# Patient Record
Sex: MALE | Race: BLACK OR AFRICAN AMERICAN | NOT HISPANIC OR LATINO | Employment: FULL TIME | URBAN - METROPOLITAN AREA
[De-identification: names, ages, dates, MRNs, and addresses within clinical notes are randomized per-mention and may not be internally consistent; named-entity substitution may affect disease eponyms.]

---

## 2017-02-23 ENCOUNTER — OFFICE VISIT (OUTPATIENT)
Dept: INTERNAL MEDICINE | Facility: CLINIC | Age: 27
End: 2017-02-23
Payer: COMMERCIAL

## 2017-02-23 ENCOUNTER — PATIENT MESSAGE (OUTPATIENT)
Dept: INTERNAL MEDICINE | Facility: CLINIC | Age: 27
End: 2017-02-23

## 2017-02-23 VITALS
WEIGHT: 216 LBS | BODY MASS INDEX: 29.26 KG/M2 | HEIGHT: 72 IN | DIASTOLIC BLOOD PRESSURE: 78 MMHG | HEART RATE: 80 BPM | SYSTOLIC BLOOD PRESSURE: 124 MMHG

## 2017-02-23 DIAGNOSIS — G47.00 INSOMNIA, UNSPECIFIED TYPE: Primary | ICD-10-CM

## 2017-02-23 PROCEDURE — 99213 OFFICE O/P EST LOW 20 MIN: CPT | Mod: S$GLB,,, | Performed by: INTERNAL MEDICINE

## 2017-02-23 PROCEDURE — 1160F RVW MEDS BY RX/DR IN RCRD: CPT | Mod: S$GLB,,, | Performed by: INTERNAL MEDICINE

## 2017-02-23 PROCEDURE — 99999 PR PBB SHADOW E&M-EST. PATIENT-LVL III: CPT | Mod: PBBFAC,,, | Performed by: INTERNAL MEDICINE

## 2017-02-23 RX ORDER — TRAZODONE HYDROCHLORIDE 50 MG/1
50 TABLET ORAL NIGHTLY PRN
Qty: 30 TABLET | Refills: 2 | Status: SHIPPED | OUTPATIENT
Start: 2017-02-23 | End: 2017-04-11 | Stop reason: SDUPTHER

## 2017-02-23 NOTE — MR AVS SNAPSHOT
[facility]  [address]  [facility] [address]  Phone: [phone]  Fax: [phone]                  Sathya Duggan   2017 10:40 AM   Office Visit    Description:  Male : 1990   Provider:  Vern Lew MD   Department:  [facility]           Reason for Visit     Insomnia           Diagnoses this Visit        Comments    Insomnia, unspecified type    -  Primary            To Do List           Goals (5 Years of Data)     None       These Medications        Disp Refills Start End    trazodone (DESYREL) 50 MG tablet 30 tablet 2 2017     Take 1 tablet (50 mg total) by mouth nightly as needed for Insomnia. - Oral    Pharmacy: [facility] [address] [place] [address] AT [facility] #: [phone]         [facility]     [facility] On Call Nurse Care Line -  Assistance  Registered nurses in the [facility] On Call Center provide clinical advisement, health education, appointment booking, and other advisory services.  Call for this free service at [phone].             Medications           Message regarding Medications     Verify the changes and/or additions to your medication regime listed below are the same as discussed with your clinician today.  If any of these changes or additions are incorrect, please notify your healthcare provider.        START taking these NEW medications        Refills    trazodone (DESYREL) 50 MG tablet 2    Sig: Take 1 tablet (50 mg total) by mouth nightly as needed for Insomnia.    Class: Normal    Route: Oral      STOP taking these medications     varenicline (CHANTIX) 1 mg Tab Take 1 tablet (1 mg total) by mouth 2 (two) times daily.    varenicline (CHANTIX JOSE E) 0.5 mg (11)- 1 mg (42) tablet As prescribed in pack    doxycycline (VIBRAMYCIN) 100 MG Cap Take 1 capsule (100 mg total) by mouth once daily.    clindamycin phosphate 1% (CLINDAGEL) 1 % gel Apply topically 2 (two)  times daily. On arms and legs.           Verify that the below list of medications is an accurate representation of the medications you are currently taking.  If none reported, the list may be blank. If incorrect, please contact your healthcare provider. Carry this list with you in case of emergency.           Current Medications     trazodone (DESYREL) 50 MG tablet Take 1 tablet (50 mg total) by mouth nightly as needed for Insomnia.           Clinical Reference Information           Your Vitals Were     BP Pulse Height Weight BMI    124/78 80 6' (1.829 m) 98 kg (216 lb) 29.29 kg/m2      Blood Pressure          Most Recent Value    BP  124/78      Allergies as of 2/23/2017     No Known Allergies      Immunizations Administered on Date of Encounter - 2/23/2017     None      Instructions    Stop pre-work out/caffeine after 2pm.    No naps and do not fall asleep/crash early.    No social media before bed.    Treating Insomnia  Good sleeping habits are a key part of treatment. If needed, some medications may help you sleep better at first. Making healthy lifestyle changes and learning to relax can improve your sleep. Treating insomnia takes commitment, but trust that your efforts will pay off. Talk to your health care provider before taking any medication.    Healthy Lifestyle  Your lifestyle affects your health and your sleep. Here are some healthy habits:  · Keep a regular sleep schedule. Go to bed and get up at the same time each day.  · Exercise regularly. It may help you reduce stress. Avoid strenuous exercise for two to four hours before bedtime.  · Avoid or limit naps, especially in the late afternoon.  · Use your bed only for sleep and sex.  · Dont spend too much time in bed trying to fall asleep. If you cant fall asleep, get up and do something (no electronics) until you become tired and drowsy.  · Avoid or limit caffeine and nicotine for up to 6 hours prior to bedtime. They can keep you awake at  night.   · Also avoid alcohol for at least 4 to 6 hours prior to bedtime. It may help you fall asleep at first, but you will have more awakenings throughout the night, and your sleep will not be restful.  Before Bedtime  To sleep better every night, try these tips:  · Have a bedtime routine to let your body and mind know when its time to sleep.  · Think of going to bed as relaxing and enjoyable. Sleep will come sooner.  · If your worries dont let you sleep, write them down in a diary. Then close it, and go to bed.  · Make sure the room is not too hot or too cold. If its not dark enough, an eye mask can help. If its noisy, try using earplugs.  Learn to Relax  Stress, anxiety, and body tension may keep you awake at night. To unwind before bedtime, try a warm bath, meditation, or yoga. Also, try the following:  · Deep breathing. Sit or lie back in a chair. Take a slow, deep breath. Hold it for 5 counts. Then breathe out slowly through your mouth. Keep doing this until you feel relaxed.  · Progressive muscle relaxation. Tense and then relax the muscles in your body as you breathe deeply. Start with your feet and work up your body to your neck and face.  © 9877-3951 [employer]. [address] [place] [address], [place] [address]. All rights reserved. This information is not intended as a substitute for professional medical care. Always follow your healthcare professional's instructions.         Language Assistance Services     ATTENTION: Language assistance services are available, free of charge. Please call [phone].      ATENCIÓN: Si habla español, tiene a león disposición servicios gratuitos de asistencia lingüística. Llame al [phone].     [facility] Ý: N?u b?n nói Ti?ng Vi?t, có các d?ch v? h? tr? ngôn ng? mi?n phí dành cho b?n. G?i s? [phone].         [facility] complies with applicable Federal civil rights laws and does not discriminate on the basis of race, color, national  origin, age, disability, or sex.

## 2017-02-23 NOTE — PROGRESS NOTES
Subjective:       Patient ID: Sathya Duggan is a 26 y.o. male.    Chief Complaint: Insomnia    HPI Comments: Chronic insomnia. 3 hrs of sleep, then EMA. This is despite being very active.    Has tried benadryl occ helps.    No drugs or etoh.    Some mild anxiety and stress re housing changes.    Review of Systems   Psychiatric/Behavioral: Positive for sleep disturbance. Negative for agitation, behavioral problems, confusion and dysphoric mood.       Objective:      Physical Exam   Constitutional: He appears well-developed and well-nourished. No distress.   Skin: He is not diaphoretic.   Psychiatric: He has a normal mood and affect. His behavior is normal. Judgment and thought content normal.       Assessment:       1. Insomnia, unspecified type        Plan:       Sathya was seen today for insomnia.    Diagnoses and all orders for this visit:    Insomnia, unspecified type  -     trazodone (DESYREL) 50 MG tablet; Take 1 tablet (50 mg total) by mouth nightly as needed for Insomnia.  Pt given handouts.  Stop pre-work out/caffeine after 2pm.    No naps and do not fall asleep/crash early.  Has had occ irregular patterns    No social media before bed.    Explained that if not better in 1-2 mos, pt should rtc/call PCP    Return to clinic previously agreed (at last visit) next due visit

## 2017-02-23 NOTE — PATIENT INSTRUCTIONS
Stop pre-work out/caffeine after 2pm.    No naps and do not fall asleep/crash early.    No social media before bed.    Treating Insomnia  Good sleeping habits are a key part of treatment. If needed, some medications may help you sleep better at first. Making healthy lifestyle changes and learning to relax can improve your sleep. Treating insomnia takes commitment, but trust that your efforts will pay off. Talk to your health care provider before taking any medication.    Healthy Lifestyle  Your lifestyle affects your health and your sleep. Here are some healthy habits:  · Keep a regular sleep schedule. Go to bed and get up at the same time each day.  · Exercise regularly. It may help you reduce stress. Avoid strenuous exercise for two to four hours before bedtime.  · Avoid or limit naps, especially in the late afternoon.  · Use your bed only for sleep and sex.  · Dont spend too much time in bed trying to fall asleep. If you cant fall asleep, get up and do something (no electronics) until you become tired and drowsy.  · Avoid or limit caffeine and nicotine for up to 6 hours prior to bedtime. They can keep you awake at night.   · Also avoid alcohol for at least 4 to 6 hours prior to bedtime. It may help you fall asleep at first, but you will have more awakenings throughout the night, and your sleep will not be restful.  Before Bedtime  To sleep better every night, try these tips:  · Have a bedtime routine to let your body and mind know when its time to sleep.  · Think of going to bed as relaxing and enjoyable. Sleep will come sooner.  · If your worries dont let you sleep, write them down in a diary. Then close it, and go to bed.  · Make sure the room is not too hot or too cold. If its not dark enough, an eye mask can help. If its noisy, try using earplugs.  Learn to Relax  Stress, anxiety, and body tension may keep you awake at night. To unwind before bedtime, try a warm bath, meditation, or yoga. Also, try the  following:  · Deep breathing. Sit or lie back in a chair. Take a slow, deep breath. Hold it for 5 counts. Then breathe out slowly through your mouth. Keep doing this until you feel relaxed.  · Progressive muscle relaxation. Tense and then relax the muscles in your body as you breathe deeply. Start with your feet and work up your body to your neck and face.  © 8698-1164 [employer]. [address] [place] [address], [place] [address]. All rights reserved. This information is not intended as a substitute for professional medical care. Always follow your healthcare professional's instructions.

## 2017-04-10 ENCOUNTER — PATIENT MESSAGE (OUTPATIENT)
Dept: INTERNAL MEDICINE | Facility: CLINIC | Age: 27
End: 2017-04-10

## 2017-04-11 DIAGNOSIS — G47.00 INSOMNIA, UNSPECIFIED TYPE: ICD-10-CM

## 2017-04-11 RX ORDER — TRAZODONE HYDROCHLORIDE 50 MG/1
50 TABLET ORAL NIGHTLY PRN
Qty: 30 TABLET | Refills: 2 | Status: SHIPPED | OUTPATIENT
Start: 2017-04-11 | End: 2017-05-25 | Stop reason: SDUPTHER

## 2017-05-10 ENCOUNTER — PATIENT MESSAGE (OUTPATIENT)
Dept: INTERNAL MEDICINE | Facility: CLINIC | Age: 27
End: 2017-05-10

## 2017-05-11 RX ORDER — VARENICLINE TARTRATE 1 MG/1
1 TABLET, FILM COATED ORAL 2 TIMES DAILY
Qty: 60 TABLET | Refills: 11 | Status: SHIPPED | OUTPATIENT
Start: 2017-05-11 | End: 2018-07-26 | Stop reason: SDUPTHER

## 2017-05-25 ENCOUNTER — PATIENT MESSAGE (OUTPATIENT)
Dept: INTERNAL MEDICINE | Facility: CLINIC | Age: 27
End: 2017-05-25

## 2017-05-25 DIAGNOSIS — G47.00 INSOMNIA, UNSPECIFIED TYPE: ICD-10-CM

## 2017-05-25 RX ORDER — TRAZODONE HYDROCHLORIDE 50 MG/1
50 TABLET ORAL NIGHTLY PRN
Qty: 30 TABLET | Refills: 11 | Status: SHIPPED | OUTPATIENT
Start: 2017-05-25 | End: 2018-07-26 | Stop reason: SDUPTHER

## 2017-11-14 ENCOUNTER — OFFICE VISIT (OUTPATIENT)
Dept: DERMATOLOGY | Facility: CLINIC | Age: 27
End: 2017-11-14
Payer: COMMERCIAL

## 2017-11-14 DIAGNOSIS — L72.0 EPIDERMAL INCLUSION CYST: Primary | ICD-10-CM

## 2017-11-14 DIAGNOSIS — L70.0 ACNE VULGARIS: ICD-10-CM

## 2017-11-14 PROCEDURE — 99999 PR PBB SHADOW E&M-EST. PATIENT-LVL III: CPT | Mod: PBBFAC,,, | Performed by: DERMATOLOGY

## 2017-11-14 PROCEDURE — 99213 OFFICE O/P EST LOW 20 MIN: CPT | Mod: S$GLB,,, | Performed by: DERMATOLOGY

## 2017-11-14 RX ORDER — DOXYCYCLINE 100 MG/1
100 CAPSULE ORAL EVERY 12 HOURS
Qty: 60 CAPSULE | Refills: 0 | Status: SHIPPED | OUTPATIENT
Start: 2017-11-14 | End: 2018-04-03 | Stop reason: ALTCHOICE

## 2017-11-14 NOTE — PROGRESS NOTES
Subjective:       Patient ID:  Sathya Duggan is a 27 y.o. male who presents for   Chief Complaint   Patient presents with    Cyst     back, x months, asym, no tx     Last OV 11/2015    Cyst  - Initial  Affected locations: back  Signs / symptoms: asymptomatic  Timing: constant  Aggravated by: pressure  Relieving factors/Treatments tried: nothing      Past Medical History:   Diagnosis Date    Asthma      Review of Systems   Constitutional: Negative for fever, chills, fatigue and malaise.   Skin: Positive for activity-related sunscreen use. Negative for daily sunscreen use and recent sunburn.   Hematologic/Lymphatic: Does not bruise/bleed easily.        Objective:    Physical Exam   Constitutional: He appears well-developed and well-nourished. No distress.   Neurological: He is alert and oriented to person, place, and time. He is not disoriented.   Psychiatric: He has a normal mood and affect.   Skin:   Areas Examined (abnormalities noted in diagram):   Head / Face Inspection Performed  Neck Inspection Performed  Back Inspection Performed  RUE Inspected  LUE Inspection Performed              Diagram Legend     Erythematous scaling macule/papule c/w actinic keratosis       Vascular papule c/w angioma      Pigmented verrucoid papule/plaque c/w seborrheic keratosis      Yellow umbilicated papule c/w sebaceous hyperplasia      Irregularly shaped tan macule c/w lentigo     1-2 mm smooth white papules consistent with Milia      Movable subcutaneous cyst with punctum c/w epidermal inclusion cyst      Subcutaneous movable cyst c/w pilar cyst      Firm pink to brown papule c/w dermatofibroma      Pedunculated fleshy papule(s) c/w skin tag(s)      Evenly pigmented macule c/w junctional nevus     Mildly variegated pigmented, slightly irregular-bordered macule c/w mildly atypical nevus      Flesh colored to evenly pigmented papule c/w intradermal nevus       Pink pearly papule/plaque c/w basal cell carcinoma      Erythematous  hyperkeratotic cursted plaque c/w SCC      Surgical scar with no sign of skin cancer recurrence      Open and closed comedones      Inflammatory papules and pustules      Verrucoid papule consistent consistent with wart     Erythematous eczematous patches and plaques     Dystrophic onycholytic nail with subungual debris c/w onychomycosis     Umbilicated papule    Erythematous-base heme-crusted tan verrucoid plaque consistent with inflamed seborrheic keratosis     Erythematous Silvery Scaling Plaque c/w Psoriasis     See annotation      Assessment / Plan:        Epidermal inclusion cyst-mildly inflamed  -     doxycycline (MONODOX) 100 MG capsule; Take 1 capsule (100 mg total) by mouth every 12 (twelve) hours.  Dispense: 60 capsule; Refill: 0    Acne vulgaris  Discussed benefits and risks of doxycyline therapy including but not limited to GI discomfort, esophageal irritation/ulceration, and increased sun sensitivity. Patient was counseled to take medicine with meals and at least 1 hour before lying down.   BPO 10% wash to chest and back daily  -     doxycycline (MONODOX) 100 MG capsule; Take 1 capsule (100 mg total) by mouth every 12 (twelve) hours.  Dispense: 60 capsule; Refill: 0             Return for 30 min appt for cyst excision if pt calls.

## 2018-03-09 ENCOUNTER — PATIENT MESSAGE (OUTPATIENT)
Dept: INTERNAL MEDICINE | Facility: CLINIC | Age: 28
End: 2018-03-09

## 2018-03-09 NOTE — TELEPHONE ENCOUNTER
Spoke with pt he said his lip is infected ans swollen for 2 days and now it has a scab on it. Pt would like an appt. Do you think the pt needs an urgent care appt for his lip.  Please advise

## 2018-03-13 ENCOUNTER — OFFICE VISIT (OUTPATIENT)
Dept: INTERNAL MEDICINE | Facility: CLINIC | Age: 28
End: 2018-03-13
Payer: COMMERCIAL

## 2018-03-13 VITALS
HEIGHT: 72 IN | BODY MASS INDEX: 28.46 KG/M2 | OXYGEN SATURATION: 99 % | HEART RATE: 73 BPM | SYSTOLIC BLOOD PRESSURE: 116 MMHG | WEIGHT: 210.13 LBS | DIASTOLIC BLOOD PRESSURE: 70 MMHG

## 2018-03-13 DIAGNOSIS — L72.9 CYST OF SKIN: ICD-10-CM

## 2018-03-13 DIAGNOSIS — K13.0 SORE OF LOWER LIP: Primary | ICD-10-CM

## 2018-03-13 PROCEDURE — 99999 PR PBB SHADOW E&M-EST. PATIENT-LVL III: CPT | Mod: PBBFAC,,, | Performed by: INTERNAL MEDICINE

## 2018-03-13 PROCEDURE — 99213 OFFICE O/P EST LOW 20 MIN: CPT | Mod: S$GLB,,, | Performed by: INTERNAL MEDICINE

## 2018-03-13 PROCEDURE — 87529 HSV DNA AMP PROBE: CPT

## 2018-03-13 NOTE — PROGRESS NOTES
Subjective:       Patient ID: Sathya Duggan is a 27 y.o. male.    Chief Complaint: Oral Swelling    Sore R sided lower lip.  Came on 1 week ago. Started fluid filled, and then popped, it burns a bit.  No other suhc lesions, no hx of HSV, or known exposure.  No f/c/ns.    Also feels lump on his back, has had for sev mos, stable size. occ expresses fluid.      Review of Systems   Constitutional: Negative for activity change, fatigue and fever.   Skin: Negative for rash and wound.       Objective:      Physical Exam   Constitutional: He appears well-developed and well-nourished. No distress.   HENT:   Herpetic appearing sore lower R lip   Skin: He is not diaphoretic.   imflamed Epidermal cyst on the back, 3cm, no redness beyond cyst       Assessment:       1. Sore of lower lip    2. Cyst of skin        Plan:       Sathya was seen today for oral swelling.    Diagnoses and all orders for this visit:    Sore of lower lip  -     Cancel: Herpes simplex virus culture [facility]; Skin (lip); Future  -     Ambulatory Referral to General Surgery  -     Herpes simplex virus culture [facility]; Skin (lip)  -     HSV by Rapid PCR, Non-Blood [facility]; Skin (lip)    Cyst of skin  -     Cancel: Herpes simplex virus culture [facility]; Skin (lip); Future  -     Ambulatory Referral to General Surgery  -     Herpes simplex virus culture [facility]; Skin (lip)    Addendum I called him and explained that we will not have test back for a few days, he voiced understanding and declines to have empiric Valtrex, will await results.

## 2018-03-14 ENCOUNTER — TELEPHONE (OUTPATIENT)
Dept: INTERNAL MEDICINE | Facility: CLINIC | Age: 28
End: 2018-03-14

## 2018-03-14 LAB
HSV1 DNA SPEC QL NAA+PROBE: NEGATIVE
HSV2 DNA SPEC QL NAA+PROBE: NEGATIVE
SPECIMEN SOURCE: NORMAL

## 2018-03-14 NOTE — TELEPHONE ENCOUNTER
Hi, please call the lab and ask when we will have the HSV result back, we need it ASAP to best treat this patient.  Let me know,  Thank you, Vern Lew

## 2018-03-14 NOTE — TELEPHONE ENCOUNTER
Spoke with lab, test was sent to [facility] and will take at least 3 days to come back.

## 2018-03-21 ENCOUNTER — PATIENT MESSAGE (OUTPATIENT)
Dept: INTERNAL MEDICINE | Facility: CLINIC | Age: 28
End: 2018-03-21

## 2018-04-03 ENCOUNTER — OFFICE VISIT (OUTPATIENT)
Dept: INTERNAL MEDICINE | Facility: CLINIC | Age: 28
End: 2018-04-03
Payer: COMMERCIAL

## 2018-04-03 ENCOUNTER — NURSE TRIAGE (OUTPATIENT)
Dept: ADMINISTRATIVE | Facility: CLINIC | Age: 28
End: 2018-04-03

## 2018-04-03 VITALS
WEIGHT: 201 LBS | BODY MASS INDEX: 27.22 KG/M2 | TEMPERATURE: 98 F | OXYGEN SATURATION: 98 % | DIASTOLIC BLOOD PRESSURE: 72 MMHG | HEIGHT: 72 IN | HEART RATE: 73 BPM | SYSTOLIC BLOOD PRESSURE: 126 MMHG

## 2018-04-03 DIAGNOSIS — L70.9 ACNE, UNSPECIFIED ACNE TYPE: Primary | ICD-10-CM

## 2018-04-03 PROCEDURE — 99999 PR PBB SHADOW E&M-EST. PATIENT-LVL IV: CPT | Mod: PBBFAC,,, | Performed by: INTERNAL MEDICINE

## 2018-04-03 PROCEDURE — 99213 OFFICE O/P EST LOW 20 MIN: CPT | Mod: S$GLB,,, | Performed by: INTERNAL MEDICINE

## 2018-04-03 RX ORDER — SULFAMETHOXAZOLE AND TRIMETHOPRIM 800; 160 MG/1; MG/1
1 TABLET ORAL 2 TIMES DAILY
Qty: 14 TABLET | Refills: 0 | Status: SHIPPED | OUTPATIENT
Start: 2018-04-03 | End: 2018-04-10

## 2018-04-03 NOTE — TELEPHONE ENCOUNTER
Reason for Disposition   [1] Boil > 1/4 inch across (> 6 mm; larger than a pencil eraser) AND [2] on face    Protocols used: ST BOIL OR [id number]    Pt states yesterday he noticed red bump and now has swelling between eyes and eyes. Care advice given. appt made.

## 2018-04-04 ENCOUNTER — PATIENT MESSAGE (OUTPATIENT)
Dept: INTERNAL MEDICINE | Facility: CLINIC | Age: 28
End: 2018-04-04

## 2018-04-08 NOTE — PROGRESS NOTES
Subjective:       Patient ID: Sathya Duggan is a 27 y.o. male.    Chief Complaint: Rash    HPI  Complains of redness and irritation of his for head.  No fever, chills, night sweats.  No drainage  Review of Systems   Constitutional: Negative for chills, fatigue, fever and unexpected weight change.   Respiratory: Negative for chest tightness and shortness of breath.    Cardiovascular: Negative for chest pain and palpitations.   Gastrointestinal: Negative for abdominal pain and blood in stool.   Neurological: Negative for dizziness, syncope, numbness and headaches.       Objective:      Physical Exam   HENT:   Right Ear: External ear normal.   Left Ear: External ear normal.   Nose: Nose normal.   Mouth/Throat: Oropharynx is clear and moist.   Eyes: Pupils are equal, round, and reactive to light.   Neck: Normal range of motion.   Cardiovascular: Normal rate and regular rhythm.    No murmur heard.  Pulmonary/Chest: Breath sounds normal.   Abdominal: He exhibits no distension. There is no hepatomegaly. There is no tenderness.   Lymphadenopathy:     He has no cervical adenopathy.     He has no axillary adenopathy.   Neurological: He has normal strength and normal reflexes. No cranial nerve deficit or sensory deficit.     red papules for head  Assessment/Plan           assessment and plan: Folliculitis: Bactrim DS 1 by mouth twice a day ×7 days.  Call if no relief.  He was here for an urgent care only appointment.  He will follow-up with his primary care physician for a physical

## 2018-04-11 ENCOUNTER — INITIAL CONSULT (OUTPATIENT)
Dept: DERMATOLOGY | Facility: CLINIC | Age: 28
End: 2018-04-11
Payer: COMMERCIAL

## 2018-04-11 DIAGNOSIS — L70.0 ACNE VULGARIS: Primary | ICD-10-CM

## 2018-04-11 PROCEDURE — 99999 PR PBB SHADOW E&M-EST. PATIENT-LVL II: CPT | Mod: PBBFAC,,, | Performed by: NURSE PRACTITIONER

## 2018-04-11 PROCEDURE — 99213 OFFICE O/P EST LOW 20 MIN: CPT | Mod: 25,S$GLB,, | Performed by: NURSE PRACTITIONER

## 2018-04-11 PROCEDURE — 11900 INJECT SKIN LESIONS </W 7: CPT | Mod: S$GLB,,, | Performed by: NURSE PRACTITIONER

## 2018-04-11 RX ORDER — ADAPALENE AND BENZOYL PEROXIDE 3; 25 MG/G; MG/G
GEL TOPICAL
Qty: 45 G | Refills: 3 | Status: SHIPPED | OUTPATIENT
Start: 2018-04-11 | End: 2018-05-11 | Stop reason: SDUPTHER

## 2018-04-11 RX ORDER — DOXYCYCLINE 100 MG/1
TABLET ORAL
Qty: 30 TABLET | Refills: 2 | Status: SHIPPED | OUTPATIENT
Start: 2018-04-11 | End: 2022-11-28

## 2018-04-11 NOTE — PROGRESS NOTES
"  Subjective:       Patient ID:  Sathya Duggan is a 27 y.o. male who presents for   Chief Complaint   Patient presents with    Cyst     between eyebrows, 1 week, abx      Acne  - Follow-up  Symptom course: worsening (Last seen by [facility] 11/14/17- treated with course of doxy- helped but symptoms returned. Has been on mulitple courses of doxy in the past. )  Currently using: Washing face BID with neutrogena. Currently on bactrim DS BID for tender "CYST" between eyebrows that developed 4/3/18 (Treated by PCP).   Affected locations: face and back  Signs / symptoms: tender (Large cyst like pimples every now and then with scattered smaller pimples. Noticing increased blackheads over last several years. )  Severity: mild to moderate        Review of Systems   HENT: Negative for nosebleeds and headaches.    Gastrointestinal: Negative for diarrhea and Sensitivity to oral antibiotics.   Musculoskeletal: Negative for arthralgias.   Skin: Positive for activity-related sunscreen use. Negative for daily sunscreen use and recent sunburn.   Neurological: Negative for headaches.   Psychiatric/Behavioral: Negative for depressed mood.        Objective:    Physical Exam   Constitutional: He appears well-developed and well-nourished. No distress.   Neurological: He is alert and oriented to person, place, and time. He is not disoriented.   Psychiatric: He has a normal mood and affect.   Skin:   Areas Examined (abnormalities noted in diagram):   Head / Face Inspection Performed  Neck Inspection Performed  Chest / Axilla Inspection Performed  Abdomen Inspection Performed  Back Inspection Performed                   Diagram Legend     Erythematous scaling macule/papule c/w actinic keratosis       Vascular papule c/w angioma      Pigmented verrucoid papule/plaque c/w seborrheic keratosis      Yellow umbilicated papule c/w sebaceous hyperplasia      Irregularly shaped tan macule c/w lentigo     1-2 mm smooth white papules consistent with Milia "      Movable subcutaneous cyst with punctum c/w epidermal inclusion cyst      Subcutaneous movable cyst c/w pilar cyst      Firm pink to brown papule c/w dermatofibroma      Pedunculated fleshy papule(s) c/w skin tag(s)      Evenly pigmented macule c/w junctional nevus     Mildly variegated pigmented, slightly irregular-bordered macule c/w mildly atypical nevus      Flesh colored to evenly pigmented papule c/w intradermal nevus       Pink pearly papule/plaque c/w basal cell carcinoma      Erythematous hyperkeratotic cursted plaque c/w SCC      Surgical scar with no sign of skin cancer recurrence      Open and closed comedones      Inflammatory papules and pustules      Verrucoid papule consistent consistent with wart     Erythematous eczematous patches and plaques     Dystrophic onycholytic nail with subungual debris c/w onychomycosis     Umbilicated papule    Erythematous-base heme-crusted tan verrucoid plaque consistent with inflamed seborrheic keratosis     Erythematous Silvery Scaling Plaque c/w Psoriasis     See annotation      Assessment / Plan:        Acne vulgaris- cystic  -     doxycycline monohydrate 100 mg Tab; Take 1 po qday  Dispense: 30 tablet; Refill: 2  -     EPIDUO FORTE 0.3-2.5 % GlwP; AAA face qhs  Dispense: 45 g; Refill: 3  -     benzoyl peroxide (BENZEPRO) 6 % Towl; Use daily in shower to trunk/neck  Dispense: 1 each; Refill: 5  Cont washing face BID  Recommend moisturizing daily  Recommend daily sun protection   Recommend wiping face and back after working out and changing clothing directly after sweating.     Intralesional Kenalog 3.0mg/cc (0.2 cc total) injected into 2 lesions on the face and back today after obtaining verbal consent including risk of surrounding hypopigmentation. Patient tolerated procedure well.    Units: 1  NDC for Kenalog 10mg/cc:  5747-4785-51             F/U in 1 month, may consider accutane at f/u if cystic acne continues           No Follow-up on file.

## 2018-04-11 NOTE — LETTER
April 11, 2018      Jessica Luis MD  [address]  [place] [address]           [facility]  [address]  [place] [address]  Phone: [phone]  Fax: [phone]          Patient: Sathya Duggan   MR Number: [id number]   YOB: 1990   Date of Visit: 4/11/2018       Dear Dr. Jessica Luis:    Thank you for referring Sathya Duggan to me for evaluation. Attached you will find relevant portions of my assessment and plan of care.    If you have questions, please do not hesitate to call me. I look forward to following Sathya Duggan along with you.    Sincerely,    Becca Dwyer NP    Enclosure  CC:  No Recipients    If you would like to receive this communication electronically, please contact [email or web address] or [phone] to request more information on [employer] Link access.    For providers and/or their staff who would like to refer a patient to [facility], please contact us through our one-stop-shop provider referral line, [facility], at [phone].    If you feel you have received this communication in error or would no longer like to receive these types of communications, please e-mail [email or web address]

## 2018-04-11 NOTE — PATIENT INSTRUCTIONS

## 2018-04-16 ENCOUNTER — OFFICE VISIT (OUTPATIENT)
Dept: SURGERY | Facility: CLINIC | Age: 28
End: 2018-04-16
Payer: COMMERCIAL

## 2018-04-16 VITALS
SYSTOLIC BLOOD PRESSURE: 135 MMHG | HEART RATE: 77 BPM | HEIGHT: 72 IN | BODY MASS INDEX: 27.33 KG/M2 | DIASTOLIC BLOOD PRESSURE: 78 MMHG | TEMPERATURE: 98 F | WEIGHT: 201.81 LBS

## 2018-04-16 DIAGNOSIS — L72.0 EPIDERMOID CYST OF SKIN: Primary | ICD-10-CM

## 2018-04-16 PROCEDURE — 99999 PR PBB SHADOW E&M-EST. PATIENT-LVL III: CPT | Mod: PBBFAC,,, | Performed by: SURGERY

## 2018-04-16 PROCEDURE — 99243 OFF/OP CNSLTJ NEW/EST LOW 30: CPT | Mod: S$GLB,,, | Performed by: PHYSICIAN ASSISTANT

## 2018-04-16 NOTE — PROGRESS NOTES
Subjective:       Patient ID: Sathya Duggan is a 27 y.o. male.    Chief Complaint: Consult    Sathya Duggan is a 26yo male with back cyst which has been present for about a year. He notes that it got infected, drained and he is currently on doxycycline. He denies fevers, chills. He is here for possible removal of cyst.      Review of Systems   Constitutional: Negative for chills and fever.   HENT: Negative for congestion and sneezing.    Eyes: Negative for photophobia and visual disturbance.   Respiratory: Negative for cough and shortness of breath.    Cardiovascular: Negative for chest pain and palpitations.   Gastrointestinal: Negative for abdominal pain, nausea and vomiting.   Endocrine: Negative for cold intolerance and heat intolerance.   Musculoskeletal: Negative for arthralgias and myalgias.   Skin: Positive for wound. Negative for rash.   Neurological: Negative for weakness.   Psychiatric/Behavioral: Negative for agitation.       Objective:      Physical Exam   Constitutional: He is oriented to person, place, and time. He appears well-developed and well-nourished. No distress.   HENT:   Head: Normocephalic and atraumatic.   Eyes: EOM are normal. No scleral icterus.   Neck: Normal range of motion. Neck supple.   Cardiovascular: Normal rate and regular rhythm.    Pulmonary/Chest: Effort normal. No respiratory distress.   Abdominal: Soft. He exhibits no distension. There is no tenderness.   Musculoskeletal: Normal range of motion. He exhibits no edema or tenderness.   Neurological: He is alert and oriented to person, place, and time.   Skin: Skin is warm and dry.   Mid upper back cyst - minimal erythema, minimal swelling, no drainage appreciated, no TTP   Psychiatric: He has a normal mood and affect.       Assessment:   26 yo male w back cyst  Plan:   Plan for removal in minors in two to three weeks  Continue antibiotics as indicated

## 2018-05-11 ENCOUNTER — OFFICE VISIT (OUTPATIENT)
Dept: DERMATOLOGY | Facility: CLINIC | Age: 28
End: 2018-05-11
Payer: COMMERCIAL

## 2018-05-11 DIAGNOSIS — L70.0 ACNE VULGARIS: Primary | ICD-10-CM

## 2018-05-11 PROCEDURE — 99999 PR PBB SHADOW E&M-EST. PATIENT-LVL III: CPT | Mod: PBBFAC,,, | Performed by: NURSE PRACTITIONER

## 2018-05-11 PROCEDURE — 99213 OFFICE O/P EST LOW 20 MIN: CPT | Mod: S$GLB,,, | Performed by: NURSE PRACTITIONER

## 2018-05-11 RX ORDER — ADAPALENE AND BENZOYL PEROXIDE 3; 25 MG/G; MG/G
GEL TOPICAL
Qty: 45 G | Refills: 3 | Status: SHIPPED | OUTPATIENT
Start: 2018-05-11 | End: 2018-09-13 | Stop reason: SDUPTHER

## 2018-05-11 NOTE — PROGRESS NOTES
Subjective:       Patient ID:  Sathya Duggan is a 27 y.o. male who presents for   Chief Complaint   Patient presents with    Follow-up     Acne     Acne  - Follow-up  Symptom course: improving (Last seen 4/11/18; treated with course of doxy (Completed 1st month) helping. Prescribed Epiduo forte qhs (Has not started medication because forgot to ) and BPP towels to back qday (Never received medication). )  Currently using: Washing face BID with cerave.  Affected locations: face and back  Signs / symptoms: tender (Intermittent tender pimples but not as severe as before starting doxy)  Severity: mild to moderate        Review of Systems   HENT: Negative for nosebleeds and headaches.    Gastrointestinal: Negative for diarrhea and Sensitivity to oral antibiotics.   Musculoskeletal: Negative for arthralgias.   Skin: Positive for activity-related sunscreen use. Negative for daily sunscreen use and recent sunburn.   Neurological: Negative for headaches.   Psychiatric/Behavioral: Negative for depressed mood.        Objective:    Physical Exam   Constitutional: He appears well-developed and well-nourished. No distress.   Neurological: He is alert and oriented to person, place, and time. He is not disoriented.   Psychiatric: He has a normal mood and affect.   Skin:   Areas Examined (abnormalities noted in diagram):   Head / Face Inspection Performed  Neck Inspection Performed  Chest / Axilla Inspection Performed  Abdomen Inspection Performed  Back Inspection Performed                   Diagram Legend     Erythematous scaling macule/papule c/w actinic keratosis       Vascular papule c/w angioma      Pigmented verrucoid papule/plaque c/w seborrheic keratosis      Yellow umbilicated papule c/w sebaceous hyperplasia      Irregularly shaped tan macule c/w lentigo     1-2 mm smooth white papules consistent with Milia      Movable subcutaneous cyst with punctum c/w epidermal inclusion cyst      Subcutaneous movable cyst c/w  pilar cyst      Firm pink to brown papule c/w dermatofibroma      Pedunculated fleshy papule(s) c/w skin tag(s)      Evenly pigmented macule c/w junctional nevus     Mildly variegated pigmented, slightly irregular-bordered macule c/w mildly atypical nevus      Flesh colored to evenly pigmented papule c/w intradermal nevus       Pink pearly papule/plaque c/w basal cell carcinoma      Erythematous hyperkeratotic cursted plaque c/w SCC      Surgical scar with no sign of skin cancer recurrence      Open and closed comedones      Inflammatory papules and pustules      Verrucoid papule consistent consistent with wart     Erythematous eczematous patches and plaques     Dystrophic onycholytic nail with subungual debris c/w onychomycosis     Umbilicated papule    Erythematous-base heme-crusted tan verrucoid plaque consistent with inflamed seborrheic keratosis     Erythematous Silvery Scaling Plaque c/w Psoriasis     See annotation      Assessment / Plan:        Acne vulgaris  -     benzoyl peroxide (BENZEPRO) 6 % Towl; Use daily in shower to trunk/neck  Dispense: 1 each; Refill: 5  -     EPIDUO FORTE 0.3-2.5 % GlwP; AAA face qhs  Dispense: 45 g; Refill: 3  Cont Doxy 100 mg po daily (Has 2 refills)    Recommend starting with pea size amount every other night with moisturizer until can tolerate every night.     Cont washing face BID          Discussed importance of not picking at lesions        Recommend Moisturizing qhs- Cerave PM        Encouraged sunscreen application every morning         Discussed with patient to avoid all face washes containing BP.              Follow-up in about 3 months (around 8/11/2018).

## 2018-05-11 NOTE — PATIENT INSTRUCTIONS

## 2018-07-26 DIAGNOSIS — G47.00 INSOMNIA, UNSPECIFIED TYPE: ICD-10-CM

## 2018-07-27 RX ORDER — TRAZODONE HYDROCHLORIDE 50 MG/1
TABLET ORAL
Qty: 30 TABLET | Refills: 5 | Status: SHIPPED | OUTPATIENT
Start: 2018-07-27 | End: 2022-11-28

## 2018-07-27 RX ORDER — VARENICLINE TARTRATE 1 MG/1
TABLET, FILM COATED ORAL
Qty: 60 TABLET | Refills: 11 | Status: SHIPPED | OUTPATIENT
Start: 2018-07-27 | End: 2019-08-27 | Stop reason: SDUPTHER

## 2018-09-13 ENCOUNTER — OFFICE VISIT (OUTPATIENT)
Dept: DERMATOLOGY | Facility: CLINIC | Age: 28
End: 2018-09-13
Payer: COMMERCIAL

## 2018-09-13 DIAGNOSIS — L70.0 ACNE VULGARIS: Primary | ICD-10-CM

## 2018-09-13 DIAGNOSIS — L21.9 SEBORRHEIC DERMATITIS: ICD-10-CM

## 2018-09-13 PROCEDURE — 99213 OFFICE O/P EST LOW 20 MIN: CPT | Mod: S$GLB,,, | Performed by: NURSE PRACTITIONER

## 2018-09-13 PROCEDURE — 99999 PR PBB SHADOW E&M-EST. PATIENT-LVL II: CPT | Mod: PBBFAC,,, | Performed by: NURSE PRACTITIONER

## 2018-09-13 RX ORDER — DAPSONE 75 MG/G
GEL TOPICAL
Qty: 60 G | Refills: 3 | Status: SHIPPED | OUTPATIENT
Start: 2018-09-13 | End: 2022-11-28

## 2018-09-13 RX ORDER — ADAPALENE AND BENZOYL PEROXIDE 3; 25 MG/G; MG/G
GEL TOPICAL
Qty: 45 G | Refills: 3 | Status: SHIPPED | OUTPATIENT
Start: 2018-09-13 | End: 2022-11-28

## 2018-09-13 RX ORDER — KETOCONAZOLE 20 MG/G
CREAM TOPICAL
Qty: 30 G | Refills: 3 | Status: SHIPPED | OUTPATIENT
Start: 2018-09-13 | End: 2022-11-28

## 2018-09-13 NOTE — PROGRESS NOTES
Subjective:       Patient ID:  Sathya Duggan is a 27 y.o. male who presents for   Chief Complaint   Patient presents with    Acne     f/u     Acne  - Follow-up  Symptom course: unchanged (last seen per NC 5/11/18)  Currently using: epiduo nightly. washing face w/ Neutrogeana nightly & occ BID. not taking PO doxy.   Affected locations: face  Signs / symptoms: asymptomatic      Also c/o new rash to left ear.  Comes and goes x1 year  Denies any treatment  Denies any s/s associated w/ rash    Review of Systems   Constitutional: Negative for fever, chills, fatigue and malaise.   HENT: Negative for nosebleeds and headaches.    Gastrointestinal: Negative for diarrhea and Sensitivity to oral antibiotics.   Musculoskeletal: Negative for arthralgias.   Skin: Negative for daily sunscreen use, activity-related sunscreen use and recent sunburn.   Neurological: Negative for headaches.   Psychiatric/Behavioral: Negative for depressed mood.   Hematologic/Lymphatic: Does not bruise/bleed easily.        Objective:    Physical Exam   Constitutional: He appears well-developed and well-nourished. No distress.   Neurological: He is alert and oriented to person, place, and time. He is not disoriented.   Psychiatric: He has a normal mood and affect.   Skin:   Areas Examined (abnormalities noted in diagram):   Scalp / Hair Palpated and Inspected  Head / Face Inspection Performed  Neck Inspection Performed  Chest / Axilla Inspection Performed  Abdomen Inspection Performed  Back Inspection Performed              Diagram Legend     Erythematous scaling macule/papule c/w actinic keratosis       Vascular papule c/w angioma      Pigmented verrucoid papule/plaque c/w seborrheic keratosis      Yellow umbilicated papule c/w sebaceous hyperplasia      Irregularly shaped tan macule c/w lentigo     1-2 mm smooth white papules consistent with Milia      Movable subcutaneous cyst with punctum c/w epidermal inclusion cyst      Subcutaneous movable cyst  c/w pilar cyst      Firm pink to brown papule c/w dermatofibroma      Pedunculated fleshy papule(s) c/w skin tag(s)      Evenly pigmented macule c/w junctional nevus     Mildly variegated pigmented, slightly irregular-bordered macule c/w mildly atypical nevus      Flesh colored to evenly pigmented papule c/w intradermal nevus       Pink pearly papule/plaque c/w basal cell carcinoma      Erythematous hyperkeratotic cursted plaque c/w SCC      Surgical scar with no sign of skin cancer recurrence      Open and closed comedones      Inflammatory papules and pustules      Verrucoid papule consistent consistent with wart     Erythematous eczematous patches and plaques     Dystrophic onycholytic nail with subungual debris c/w onychomycosis     Umbilicated papule    Erythematous-base heme-crusted tan verrucoid plaque consistent with inflamed seborrheic keratosis     Erythematous Silvery Scaling Plaque c/w Psoriasis     See annotation      Assessment / Plan:        Acne vulgaris  -     benzoyl peroxide (BENZEPRO) 6 % Towl; Use daily in shower to trunk/neck  Dispense: 1 each; Refill: 5  -     EPIDUO FORTE 0.3-2.5 % GlwP; AAA face qhs  Dispense: 45 g; Refill: 3  -     ACZONE 7.5 % GlwP; AAA face daily - bid  Dispense: 60 g; Refill: 3    Wash face BID    Continue Epiduo forte nightly    Add aczone q am     Encourage patient to order BP wipes from [facility]    Seborrheic dermatitis- left ear  -     ketoconazole (NIZORAL) 2 % cream; AAA bid to left ear  Dispense: 30 g; Refill: 3             Follow-up in about 3 months (around 12/13/2018).

## 2018-12-13 ENCOUNTER — LAB VISIT (OUTPATIENT)
Dept: LAB | Facility: HOSPITAL | Age: 28
End: 2018-12-13
Payer: COMMERCIAL

## 2018-12-13 ENCOUNTER — OFFICE VISIT (OUTPATIENT)
Dept: DERMATOLOGY | Facility: CLINIC | Age: 28
End: 2018-12-13
Payer: COMMERCIAL

## 2018-12-13 DIAGNOSIS — L98.9 DERMATOSIS: ICD-10-CM

## 2018-12-13 DIAGNOSIS — L70.0 ACNE VULGARIS: ICD-10-CM

## 2018-12-13 DIAGNOSIS — L98.9 DERMATOSIS: Primary | ICD-10-CM

## 2018-12-13 LAB
ALBUMIN SERPL BCP-MCNC: 4.1 G/DL
ALP SERPL-CCNC: 114 U/L
ALT SERPL W/O P-5'-P-CCNC: 19 U/L
ANION GAP SERPL CALC-SCNC: 7 MMOL/L
AST SERPL-CCNC: 19 U/L
BASOPHILS # BLD AUTO: 0.04 K/UL
BASOPHILS NFR BLD: 0.7 %
BILIRUB SERPL-MCNC: 0.7 MG/DL
BUN SERPL-MCNC: 12 MG/DL
CALCIUM SERPL-MCNC: 9.2 MG/DL
CHLORIDE SERPL-SCNC: 110 MMOL/L
CO2 SERPL-SCNC: 24 MMOL/L
CREAT SERPL-MCNC: 1.3 MG/DL
DIFFERENTIAL METHOD: ABNORMAL
EOSINOPHIL # BLD AUTO: 0.2 K/UL
EOSINOPHIL NFR BLD: 2.6 %
ERYTHROCYTE [DISTWIDTH] IN BLOOD BY AUTOMATED COUNT: 12.5 %
EST. GFR  (AFRICAN AMERICAN): >60 ML/MIN/1.73 M^2
EST. GFR  (NON AFRICAN AMERICAN): >60 ML/MIN/1.73 M^2
GLUCOSE SERPL-MCNC: 89 MG/DL
HCT VFR BLD AUTO: 45.1 %
HGB BLD-MCNC: 16.1 G/DL
IMM GRANULOCYTES # BLD AUTO: 0.01 K/UL
IMM GRANULOCYTES NFR BLD AUTO: 0.2 %
LYMPHOCYTES # BLD AUTO: 2 K/UL
LYMPHOCYTES NFR BLD: 34.7 %
MCH RBC QN AUTO: 33.4 PG
MCHC RBC AUTO-ENTMCNC: 35.7 G/DL
MCV RBC AUTO: 94 FL
MONOCYTES # BLD AUTO: 0.5 K/UL
MONOCYTES NFR BLD: 8.4 %
NEUTROPHILS # BLD AUTO: 3 K/UL
NEUTROPHILS NFR BLD: 53.4 %
NRBC BLD-RTO: 0 /100 WBC
PLATELET # BLD AUTO: 230 K/UL
PMV BLD AUTO: 10.3 FL
POTASSIUM SERPL-SCNC: 4.2 MMOL/L
PROT SERPL-MCNC: 8.5 G/DL
RBC # BLD AUTO: 4.82 M/UL
SODIUM SERPL-SCNC: 141 MMOL/L
WBC # BLD AUTO: 5.7 K/UL

## 2018-12-13 PROCEDURE — 88305 TISSUE EXAM BY PATHOLOGIST: CPT | Performed by: PATHOLOGY

## 2018-12-13 PROCEDURE — 86235 NUCLEAR ANTIGEN ANTIBODY: CPT

## 2018-12-13 PROCEDURE — 86039 ANTINUCLEAR ANTIBODIES (ANA): CPT

## 2018-12-13 PROCEDURE — 36415 COLL VENOUS BLD VENIPUNCTURE: CPT

## 2018-12-13 PROCEDURE — 80053 COMPREHEN METABOLIC PANEL: CPT

## 2018-12-13 PROCEDURE — 11100 PR BIOPSY OF SKIN LESION: CPT | Mod: S$GLB,,, | Performed by: NURSE PRACTITIONER

## 2018-12-13 PROCEDURE — 11101 PR BIOPSY, EACH ADDED LESION: CPT | Mod: S$GLB,,, | Performed by: NURSE PRACTITIONER

## 2018-12-13 PROCEDURE — 99999 PR PBB SHADOW E&M-EST. PATIENT-LVL III: CPT | Mod: PBBFAC,,, | Performed by: NURSE PRACTITIONER

## 2018-12-13 PROCEDURE — 85025 COMPLETE CBC W/AUTO DIFF WBC: CPT

## 2018-12-13 PROCEDURE — 86038 ANTINUCLEAR ANTIBODIES: CPT

## 2018-12-13 PROCEDURE — 99214 OFFICE O/P EST MOD 30 MIN: CPT | Mod: 25,S$GLB,, | Performed by: NURSE PRACTITIONER

## 2018-12-13 RX ORDER — BETAMETHASONE DIPROPIONATE 0.5 MG/G
CREAM TOPICAL
Qty: 45 G | Refills: 0 | Status: SHIPPED | OUTPATIENT
Start: 2018-12-13 | End: 2022-11-28

## 2018-12-13 NOTE — PROGRESS NOTES
Subjective:       Patient ID:  Sathya Duggan is a 28 y.o. male who presents for   Chief Complaint   Patient presents with    Acne     follow up     Acne  - Follow-up  Symptom course: unchanged (last seen 9/13/18)  Currently using: Epiduo forte qhs, keto crm to left ear and cheek, and Neutrogena face wash.  Signs / symptoms: dryness    Rash  - Follow-up  Symptom course: worsening (last seen 9/13/18)  Currently using: Initally presented on left helix, started on Ketoconazole cream. Patient reports has spread to left cheek   Affected locations: face  Signs / symptoms: scaling and spreading  Severity: mild      Denies any family h/o autoimmune diseases    Review of Systems   Constitutional: Negative for fever, chills, weight loss, weight gain, fatigue, night sweats and malaise.        Smokes 1ppd   Musculoskeletal: Negative for myalgias, joint swelling and arthralgias.   Skin: Positive for rash and dry skin.   Hematologic/Lymphatic: Does not bruise/bleed easily.        Objective:    Physical Exam   Constitutional: He appears well-developed and well-nourished.   Neurological: He is alert and oriented to person, place, and time.   Psychiatric: He has a normal mood and affect.   Skin:   Areas Examined (abnormalities noted in diagram):   Scalp / Hair Palpated and Inspected  Head / Face Inspection Performed  Neck Inspection Performed  Chest / Axilla Inspection Performed              Diagram Legend     Erythematous scaling macule/papule c/w actinic keratosis       Vascular papule c/w angioma      Pigmented verrucoid papule/plaque c/w seborrheic keratosis      Yellow umbilicated papule c/w sebaceous hyperplasia      Irregularly shaped tan macule c/w lentigo     1-2 mm smooth white papules consistent with Milia      Movable subcutaneous cyst with punctum c/w epidermal inclusion cyst      Subcutaneous movable cyst c/w pilar cyst      Firm pink to brown papule c/w dermatofibroma      Pedunculated fleshy papule(s) c/w skin  tag(s)      Evenly pigmented macule c/w junctional nevus     Mildly variegated pigmented, slightly irregular-bordered macule c/w mildly atypical nevus      Flesh colored to evenly pigmented papule c/w intradermal nevus       Pink pearly papule/plaque c/w basal cell carcinoma      Erythematous hyperkeratotic cursted plaque c/w SCC      Surgical scar with no sign of skin cancer recurrence      Open and closed comedones      Inflammatory papules and pustules      Verrucoid papule consistent consistent with wart     Erythematous eczematous patches and plaques     Dystrophic onycholytic nail with subungual debris c/w onychomycosis     Umbilicated papule    Erythematous-base heme-crusted tan verrucoid plaque consistent with inflamed seborrheic keratosis     Erythematous Silvery Scaling Plaque c/w Psoriasis     See annotation              Assessment / Plan:      Pathology Orders:     Normal Orders This Visit    Tissue Specimen To Pathology, Dermatology     Questions:    Directional Terms:  Other(comment)    Clinical information:  r/o DLE vs other    Specific Site:  right prearicular (florencia-lesional)    Tissue Specimen To Pathology, Dermatology     Questions:    Directional Terms:  Other(comment)    Clinical information:  r/o DLE    Specific Site:  left lateral cheek- lesional        Dermatosis, NOS  Likely DLE  -     Tissue Specimen To Pathology, Dermatology  -     Tissue Specimen To Pathology, Dermatology  -     CONNIE; Future  -     CBC auto differential; Future  -     Comprehensive metabolic panel; Future  -     augmented betamethasone dipropionate (DIPROLENE AF) 0.05 % cream; AAA scabbed lesions to face BID  Dispense: 45 g; Refill: 0    Punch biopsy procedure note:x2  Punch biopsy performed after verbal consent obtained. Area marked and prepped with alcohol. Approximately 1cc of 1% lidocaine with epinephrine injected. 3 mm disposable punch used to remove lesion. Hemostasis obtained and biopsy site closed with 1 - 2 Prolene  sutures. Wound care instructions reviewed with patient and handout given.    Patient instructed in importance in daily sun protection of at least spf 30. Sun avoidance and topical protection discussed.   Recommend elta MD clear for daily use on face and neck.  Patient encouraged to wear hat for all outdoor exposure.       Patient also seen and evaluated today by collaborating physician, Dr. Tripathi.     Acne vulgaris- KAMLA  Wash face BID w/ gentle cleanser  Ok to continue Epiduo forte to chin & forehead               Follow-up in about 2 weeks (around 12/27/2018) for Biopsy results with JAM.

## 2018-12-13 NOTE — PATIENT INSTRUCTIONS
Continue Epiduo forte to chin & forehead  Diprolene to scabs twice/day  Sunscreen in AM  Hat when outside     Punch Biopsy Wound Care    Your doctor has performed a punch biopsy today.  A band aid and antibiotic ointment has been placed over the site.  This should remain in place for 24 hours.  It is recommended that you keep the area dry for the first 24 hours.  After 24 hours, you may remove the band aid and wash the area with warm soap and water and apply Vaseline jelly.  Many patients prefer to use Neosporin or Bacitracin ointment.  This is acceptable; however know that you can develop an allergy to this medication even if you have used it safely for years.  It is important to keep the area moist.  Letting it dry out and get air slows healing time, will worsen the scar, and make it more difficult to remove the stitches if they were placed.  Band aid is optional after first 24 hours.      If you notice increasing redness, tenderness, pain, or yellow drainage at the biopsy or surgical site, please notify your doctor.  These are signs of an infection.    If your biopsy/surgical site is bleeding, apply firm pressure for 15 minutes straight.  Repeat for another 15 minutes, if it is still bleeding.   If the surgical site continues to bleed, then please contact your doctor.      [facility] [place] [address]/ [phone] FAX/ [email or web address]         Continue Epiduo forte to chin & forehead  Diprolene to scabs twice/day  Sunscreen in AM  Hat when outside

## 2018-12-14 LAB
ANA SER QL IF: POSITIVE
ANA TITR SER IF: NORMAL {TITER}

## 2018-12-18 LAB
ANTI SM ANTIBODY: 2.16 EU
ANTI SM/RNP ANTIBODY: 13.72 EU
ANTI-SM INTERPRETATION: NEGATIVE
ANTI-SM/RNP INTERPRETATION: NEGATIVE
ANTI-SSA ANTIBODY: 3.46 EU
ANTI-SSA INTERPRETATION: NEGATIVE
ANTI-SSB ANTIBODY: 0.62 EU
ANTI-SSB INTERPRETATION: NEGATIVE
DSDNA AB SER-ACNC: NORMAL [IU]/ML

## 2018-12-27 ENCOUNTER — OFFICE VISIT (OUTPATIENT)
Dept: DERMATOLOGY | Facility: CLINIC | Age: 28
End: 2018-12-27
Payer: COMMERCIAL

## 2018-12-27 DIAGNOSIS — L93.0 DISCOID LUPUS ERYTHEMATOSUS: Primary | ICD-10-CM

## 2018-12-27 DIAGNOSIS — L93.0 DISCOID LUPUS: Primary | ICD-10-CM

## 2018-12-27 PROCEDURE — 99213 OFFICE O/P EST LOW 20 MIN: CPT | Mod: S$GLB,,, | Performed by: DERMATOLOGY

## 2018-12-27 PROCEDURE — 99999 PR PBB SHADOW E&M-EST. PATIENT-LVL II: CPT | Mod: PBBFAC,,, | Performed by: DERMATOLOGY

## 2018-12-27 RX ORDER — HYDROXYCHLOROQUINE SULFATE 200 MG/1
200 TABLET, FILM COATED ORAL 2 TIMES DAILY
Qty: 60 TABLET | Refills: 3 | Status: SHIPPED | OUTPATIENT
Start: 2018-12-27 | End: 2022-11-28

## 2018-12-27 NOTE — PROGRESS NOTES
Subjective:       Patient ID:  Sathya Duggan is a 28 y.o. male who presents for   Chief Complaint   Patient presents with    Rash     Bx results      Last seen by 12/13/18 by GP for bx:    FINAL PATHOLOGIC DIAGNOSIS  1. Skin, right preauricular (perilesional), punch biopsy:  - CHANGES MOST COMPATIBLE WITH DISCOID LUPUS ERYTHEMATOSUS.  MICROSCOPIC DESCRIPTION: The biopsy shows compact orthokeratosis, epidermal atrophy and focal vacuolar  interface changes with rare apoptotic keratinocytes. Follicular plugging is also noted. There is a superficial and deep  dermal perivascular and perifollicular lymphohistiocytic infiltrate. Increased dermal mucin is also noted.  2. Skin, left lateral cheek (lesional), punch biopsy:  - CHANGES MOST COMPATIBLE WITH DISCOID LUPUS ERYTHEMATOSUS.    CONNIE positive at 1:640 homogenous        Review of Systems   HENT: Negative for headaches.    Respiratory: Negative for shortness of breath.         Pt smokes 1 ppd - on chantix     Musculoskeletal: Negative for arthralgias.   Skin: Positive for rash, sun sensitivity and activity-related sunscreen use. Negative for itching, daily sunscreen use and recent sunburn.   Neurological: Negative for seizures and headaches.        Objective:    Physical Exam   Constitutional: He appears well-developed and well-nourished. No distress.   Neurological: He is alert and oriented to person, place, and time. He is not disoriented.   Psychiatric: He has a normal mood and affect.   Skin:   Areas Examined (abnormalities noted in diagram):   Scalp / Hair Palpated and Inspected  Head / Face Inspection Performed  Neck Inspection Performed  Chest / Axilla Inspection Performed              Diagram Legend     Erythematous scaling macule/papule c/w actinic keratosis       Vascular papule c/w angioma      Pigmented verrucoid papule/plaque c/w seborrheic keratosis      Yellow umbilicated papule c/w sebaceous hyperplasia      Irregularly shaped tan macule c/w lentigo      1-2 mm smooth white papules consistent with Milia      Movable subcutaneous cyst with punctum c/w epidermal inclusion cyst      Subcutaneous movable cyst c/w pilar cyst      Firm pink to brown papule c/w dermatofibroma      Pedunculated fleshy papule(s) c/w skin tag(s)      Evenly pigmented macule c/w junctional nevus     Mildly variegated pigmented, slightly irregular-bordered macule c/w mildly atypical nevus      Flesh colored to evenly pigmented papule c/w intradermal nevus       Pink pearly papule/plaque c/w basal cell carcinoma      Erythematous hyperkeratotic cursted plaque c/w SCC      Surgical scar with no sign of skin cancer recurrence      Open and closed comedones      Inflammatory papules and pustules      Verrucoid papule consistent consistent with wart     Erythematous eczematous patches and plaques     Dystrophic onycholytic nail with subungual debris c/w onychomycosis     Umbilicated papule    Erythematous-base heme-crusted tan verrucoid plaque consistent with inflamed seborrheic keratosis     Erythematous Silvery Scaling Plaque c/w Psoriasis     See annotation    Lab Results   Component Value Date    WBC 5.70 12/13/2018    HGB 16.1 12/13/2018    HCT 45.1 12/13/2018    MCV 94 12/13/2018     12/13/2018     Lab Results   Component Value Date    ALT 19 12/13/2018    AST 19 12/13/2018    ALKPHOS 114 12/13/2018    BILITOT 0.7 12/13/2018     CONNIE positive at 1:640 homogenous    Assessment / Plan:        Discoid lupus erythematosus  -     hydroxychloroquine (PLAQUENIL) 200 mg tablet; Take 1 tablet (200 mg total) by mouth 2 (two) times daily.  Dispense: 60 tablet; Refill: 3  D/c smoking  Aggressive sun protection  -     Ambulatory Referral to Rheumatology  Does not meet criteria for SLE at this time. Will send to rheum for baseline eval.       Discoid lupus erythematosus  Today's Plan:      Discussed benefits and risks for treatment with Plaquenil with patient. Patient will require a baseline and  yearly ophthalmologic examination.   Will need CBC q month x 3 then q 3 - 6 months. Patient expresses understanding.  Needs aggressive sun protection.    Must D/C smoking when taking Plaquenil as smoking decreases effectiveness.          Follow-up in about 3 months (around 3/27/2019) for with labs.

## 2018-12-27 NOTE — ASSESSMENT & PLAN NOTE
Today's Plan:      Discussed benefits and risks for treatment with Plaquenil with patient. Patient will require a baseline and yearly ophthalmologic examination.   Will need CBC q month x 3 then q 3 - 6 months. Patient expresses understanding.  Needs aggressive sun protection.    Must D/C smoking when taking Plaquenil as smoking decreases effectiveness.

## 2019-02-18 ENCOUNTER — LAB VISIT (OUTPATIENT)
Dept: LAB | Facility: HOSPITAL | Age: 29
End: 2019-02-18
Attending: INTERNAL MEDICINE
Payer: COMMERCIAL

## 2019-02-18 ENCOUNTER — INITIAL CONSULT (OUTPATIENT)
Dept: RHEUMATOLOGY | Facility: CLINIC | Age: 29
End: 2019-02-18
Payer: COMMERCIAL

## 2019-02-18 VITALS
WEIGHT: 225.75 LBS | HEIGHT: 72 IN | DIASTOLIC BLOOD PRESSURE: 93 MMHG | BODY MASS INDEX: 30.58 KG/M2 | HEART RATE: 87 BPM | SYSTOLIC BLOOD PRESSURE: 135 MMHG

## 2019-02-18 DIAGNOSIS — L93.0 DISCOID LUPUS ERYTHEMATOSUS: Primary | ICD-10-CM

## 2019-02-18 DIAGNOSIS — L93.0 DISCOID LUPUS ERYTHEMATOSUS: ICD-10-CM

## 2019-02-18 LAB
C3 SERPL-MCNC: 93 MG/DL
C4 SERPL-MCNC: 18 MG/DL
CCP AB SER IA-ACNC: <0.5 U/ML
DAT IGG-SP REAG RBC-IMP: NORMAL
RHEUMATOID FACT SERPL-ACNC: 15 IU/ML

## 2019-02-18 PROCEDURE — 87340 HEPATITIS B SURFACE AG IA: CPT

## 2019-02-18 PROCEDURE — 86146 BETA-2 GLYCOPROTEIN ANTIBODY: CPT

## 2019-02-18 PROCEDURE — 99204 OFFICE O/P NEW MOD 45 MIN: CPT | Mod: S$GLB,,, | Performed by: INTERNAL MEDICINE

## 2019-02-18 PROCEDURE — 99204 PR OFFICE/OUTPT VISIT, NEW, LEVL IV, 45-59 MIN: ICD-10-PCS | Mod: S$GLB,,, | Performed by: INTERNAL MEDICINE

## 2019-02-18 PROCEDURE — 86803 HEPATITIS C AB TEST: CPT

## 2019-02-18 PROCEDURE — 86787 VARICELLA-ZOSTER ANTIBODY: CPT

## 2019-02-18 PROCEDURE — 86147 CARDIOLIPIN ANTIBODY EA IG: CPT | Mod: 59

## 2019-02-18 PROCEDURE — 86704 HEP B CORE ANTIBODY TOTAL: CPT

## 2019-02-18 PROCEDURE — 3008F PR BODY MASS INDEX (BMI) DOCUMENTED: ICD-10-PCS | Mod: CPTII,S$GLB,, | Performed by: INTERNAL MEDICINE

## 2019-02-18 PROCEDURE — 3008F BODY MASS INDEX DOCD: CPT | Mod: CPTII,S$GLB,, | Performed by: INTERNAL MEDICINE

## 2019-02-18 PROCEDURE — 86682 HELMINTH ANTIBODY: CPT

## 2019-02-18 PROCEDURE — 99999 PR PBB SHADOW E&M-EST. PATIENT-LVL III: ICD-10-PCS | Mod: PBBFAC,,, | Performed by: INTERNAL MEDICINE

## 2019-02-18 PROCEDURE — 99999 PR PBB SHADOW E&M-EST. PATIENT-LVL III: CPT | Mod: PBBFAC,,, | Performed by: INTERNAL MEDICINE

## 2019-02-18 PROCEDURE — 86235 NUCLEAR ANTIGEN ANTIBODY: CPT

## 2019-02-18 PROCEDURE — 86431 RHEUMATOID FACTOR QUANT: CPT

## 2019-02-18 PROCEDURE — 86790 VIRUS ANTIBODY NOS: CPT

## 2019-02-18 PROCEDURE — 86200 CCP ANTIBODY: CPT

## 2019-02-18 PROCEDURE — 86160 COMPLEMENT ANTIGEN: CPT

## 2019-02-18 PROCEDURE — 86880 COOMBS TEST DIRECT: CPT

## 2019-02-18 PROCEDURE — 86592 SYPHILIS TEST NON-TREP QUAL: CPT

## 2019-02-18 PROCEDURE — 86160 COMPLEMENT ANTIGEN: CPT | Mod: 59

## 2019-02-18 PROCEDURE — 85613 RUSSELL VIPER VENOM DILUTED: CPT

## 2019-02-18 PROCEDURE — 86706 HEP B SURFACE ANTIBODY: CPT

## 2019-02-18 PROCEDURE — 86703 HIV-1/HIV-2 1 RESULT ANTBDY: CPT

## 2019-02-18 ASSESSMENT — ROUTINE ASSESSMENT OF PATIENT INDEX DATA (RAPID3)
PAIN SCORE: 0
PATIENT GLOBAL ASSESSMENT SCORE: 0
MDHAQ FUNCTION SCORE: 0
TOTAL RAPID3 SCORE: 0
PSYCHOLOGICAL DISTRESS SCORE: 2.2
FATIGUE SCORE: 0
AM STIFFNESS SCORE: 0, NO

## 2019-02-18 NOTE — PROGRESS NOTES
Chief Complaint   Patient presents with    Disease Management    Pain       Patient was referred by      History of presenting illness    28 year old black male comes in with    A rash on the face   Looks like acne and scars  Started early 2018  Initially treated as acne and then recently had a skin biopsy  Sun triggered it     FINAL PATHOLOGIC DIAGNOSIS  1. Skin, right preauricular (perilesional), punch biopsy:  - CHANGES MOST COMPATIBLE WITH DISCOID LUPUS ERYTHEMATOSUS.  MICROSCOPIC DESCRIPTION: The biopsy shows compact orthokeratosis, epidermal atrophy and focal vacuolar  interface changes with rare apoptotic keratinocytes. Follicular plugging is also noted. There is a superficial and deep  dermal perivascular and perifollicular lymphohistiocytic infiltrate. Increased dermal mucin is also noted.  2. Skin, left lateral cheek (lesional), punch biopsy:  - CHANGES MOST COMPATIBLE WITH DISCOID LUPUS ERYTHEMATOSUS.    He has tried topical steroids and oral steroids   More recently he was started on plaquenil and he didn't take it      He also has PIP pain intermittently  He does everything with no abnormal joint pains  He did have some swelling     No telangiectasias  No calcinosis  No malar rash     No patchy alopecia  He has family h/o losing hair and he keeps it shaved   No oral and nasal ulcers  No sicca symptoms     No pleurisy or any cardiopulmonary complaints  No dysphagia,diplopia and dysphonia and muscle weakness    No n/v/d/c  No acid reflux+    No raynaud's+  No digital ulcers     No cytopenias  No renal issues    No blood clots     No fever,chills,night sweats,weight loss and loss of appetite     CONNIE positive  Subsets negative  CONNIE positive at 1:640 homogenous     CBC nml  CMP nml      Past history : discoid lupus    Family history : aunt has autoimmune illness    Social history : former smoker,quit   Quit jan 2019    Review of Systems   Constitutional: Negative for activity change, appetite  change, chills, diaphoresis, fatigue, fever and unexpected weight change.   HENT: Negative for congestion, dental problem, drooling, ear discharge, ear pain, facial swelling, hearing loss, mouth sores, nosebleeds, postnasal drip, rhinorrhea, sinus pressure, sinus pain, sneezing, sore throat, tinnitus, trouble swallowing and voice change.    Eyes: Negative for photophobia, pain, discharge, redness, itching and visual disturbance.   Respiratory: Negative for apnea, cough, choking, chest tightness, shortness of breath, wheezing and stridor.    Cardiovascular: Negative for chest pain, palpitations and leg swelling.   Gastrointestinal: Negative for abdominal distention, abdominal pain, anal bleeding, blood in stool, constipation, diarrhea, nausea, rectal pain and vomiting.   Endocrine: Negative for cold intolerance, heat intolerance, polydipsia, polyphagia and polyuria.   Genitourinary: Negative for decreased urine volume, difficulty urinating, dysuria, enuresis, flank pain, frequency, genital sores, hematuria and urgency.   Musculoskeletal: Positive for arthralgias. Negative for back pain, gait problem, joint swelling, myalgias, neck pain and neck stiffness.   Skin: Positive for rash. Negative for color change, pallor and wound.   Allergic/Immunologic: Negative for environmental allergies, food allergies and immunocompromised state.   Neurological: Negative for dizziness, tremors, seizures, syncope, facial asymmetry, speech difficulty, weakness, light-headedness, numbness and headaches.   Hematological: Negative for adenopathy. Does not bruise/bleed easily.   Psychiatric/Behavioral: Negative for agitation, behavioral problems, confusion, decreased concentration, dysphoric mood, hallucinations, self-injury, sleep disturbance and suicidal ideas. The patient is not nervous/anxious and is not hyperactive.      Physical Exam     MERAZ-28 tender joint count: 0  MERAZ-28 swollen joint count: 0    Physical Exam   Constitutional: He  is oriented to person, place, and time and well-developed, well-nourished, and in no distress. No distress.   HENT:   Head: Normocephalic.   Mouth/Throat: Oropharynx is clear and moist.   Eyes: Conjunctivae are normal. Pupils are equal, round, and reactive to light. Right eye exhibits no discharge. Left eye exhibits no discharge. No scleral icterus.   Neck: Normal range of motion. No thyromegaly present.   Cardiovascular: Normal rate, regular rhythm, normal heart sounds and intact distal pulses.    Pulmonary/Chest: Effort normal and breath sounds normal. No stridor.   Abdominal: Soft. Bowel sounds are normal.   Lymphadenopathy:     He has no cervical adenopathy.   Neurological: He is alert and oriented to person, place, and time.   Skin: Skin is warm. No rash noted. He is not diaphoretic.     Psychiatric: Affect and judgment normal.   Musculoskeletal: Normal range of motion.       Hyperpigmented lesion on the left side of the face with scarring  Erythematous active lesion on the right side of the face     Assessment     28 year old black male with well controlled asthma  One year of rash  Derm eval recently : discoid lupus  Has had some hand arthralgias with pain and swelling in the PIPs    CONNIE positive subsets negative    He has discoid lupus + joint pains    He doesn't fit the criteria for systemic lupus    1. Discoid lupus erythematosus          New problem     Plan    Would start taking the plaquenil 200 mg bid as suggested by dermatology and see an eye doctor every year    RA panel    Will complete labs for the systemic lupus eval    See us every 6 months for clinical and lab monitoring    Sunscreen use  Avoid getting back smoking   Eat healthy    Aleph was seen today for disease management and pain.    Diagnoses and all orders for this visit:    Discoid lupus erythematosus  -     Sjogrens syndrome-A extractable nuclear antibody; Future  -     Rheumatoid factor; Future  -     Cyclic citrul peptide antibody,  IgG; Future  -     HIV 1/2 Ag/Ab (4th Gen); Future  -     Hepatitis B surface antigen; Future  -     HBcAB; Future  -     Hepatitis B surface antibody; Future  -     Hepatitis C antibody; Future  -     Hepatitis A antibody, IgG; Future  -     Strongyloides IgG Antibodies; Future  -     Quantiferon Gold TB; Future  -     RPR; Future  -     Varicella zoster antibody, IgG; Future  -     Cardiolipin antibody; Future  -     DRVVT; Future  -     Beta-2 glycoprotein antibodies; Future  -     Direct antiglobulin test; Future  -     C3 complement; Future  -     C4 complement; Future  -     Protein / creatinine ratio, urine; Future  -     Urinalysis; Future

## 2019-02-18 NOTE — LETTER
February 18, 2019      Faby Tripathi MD  [address]  [facility] [address]           [facility]  [address]  [facility] [address]  Phone: [phone]  Fax: [phone]          Patient: Sathya Duggan   MR Number: [id number]   YOB: 1990   Date of Visit: 2/18/2019       Dear Dr. Faby Tripathi:    Thank you for referring Sathya Duggan to me for evaluation. Attached you will find relevant portions of my assessment and plan of care.    If you have questions, please do not hesitate to call me. I look forward to following Sathya Duggan along with you.    Sincerely,    Petr Morejon MD    Enclosure  CC:  No Recipients    If you would like to receive this communication electronically, please contact [email or web address] or [phone] to request more information on [employer] access.    For providers and/or their staff who would like to refer a patient to [facility], please contact us through our one-stop-shop provider referral line, [facility], at [phone].    If you feel you have received this communication in error or would no longer like to receive these types of communications, please e-mail [email or web address]

## 2019-02-19 LAB
ANTI-SSA ANTIBODY: 4.04 EU
ANTI-SSA INTERPRETATION: NEGATIVE
CARDIOLIPIN IGG SER IA-ACNC: <9.4 GPL
CARDIOLIPIN IGM SER IA-ACNC: 11.29 MPL
HBV CORE AB SERPL QL IA: NEGATIVE
HBV SURFACE AB SER-ACNC: POSITIVE M[IU]/ML
HBV SURFACE AG SERPL QL IA: NEGATIVE
HCV AB SERPL QL IA: NEGATIVE
HEPATITIS A ANTIBODY, IGG: NEGATIVE
HIV 1+2 AB+HIV1 P24 AG SERPL QL IA: NEGATIVE
LA PPP-IMP: NEGATIVE
RPR SER QL: NORMAL

## 2019-02-20 LAB
STRONGYLOIDES ANTIBODY IGG: NEGATIVE
VARICELLA INTERPRETATION: POSITIVE
VARICELLA ZOSTER IGG: 2.19 ISR

## 2019-02-21 LAB
B2 GLYCOPROT1 IGA SER QL: <9 SAU
B2 GLYCOPROT1 IGG SER QL: <9 SGU
B2 GLYCOPROT1 IGM SER QL: <9 SMU

## 2019-08-28 RX ORDER — VARENICLINE TARTRATE 1 MG/1
TABLET, FILM COATED ORAL
Qty: 60 TABLET | Refills: 11 | Status: SHIPPED | OUTPATIENT
Start: 2019-08-28 | End: 2021-05-26 | Stop reason: SDUPTHER

## 2020-05-26 ENCOUNTER — HOSPITAL ENCOUNTER (EMERGENCY)
Facility: HOSPITAL | Age: 30
Discharge: HOME | End: 2020-05-27
Attending: EMERGENCY MEDICINE
Payer: COMMERCIAL
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
## 2020-05-26 PROCEDURE — 12002 PR RESUP NPTERF WND BODY 2.6-7.5 CM: ICD-10-PCS | Mod: ,,, | Performed by: EMERGENCY MEDICINE

## 2020-05-26 PROCEDURE — 25000003 PHARM REV CODE 250: Performed by: EMERGENCY MEDICINE

## 2020-05-26 PROCEDURE — 63600175 PHARM REV CODE 636 W HCPCS: Performed by: EMERGENCY MEDICINE

## 2020-05-26 PROCEDURE — 12002 RPR S/N/AX/GEN/TRNK2.6-7.5CM: CPT | Mod: ,,, | Performed by: EMERGENCY MEDICINE

## 2020-05-26 PROCEDURE — 99284 EMERGENCY DEPT VISIT MOD MDM: CPT | Mod: 25

## 2020-05-26 PROCEDURE — 12002 RPR S/N/AX/GEN/TRNK2.6-7.5CM: CPT | Mod: LT

## 2020-05-26 PROCEDURE — 99284 EMERGENCY DEPT VISIT MOD MDM: CPT | Mod: 25,,, | Performed by: EMERGENCY MEDICINE

## 2020-05-26 PROCEDURE — 99284 PR EMERGENCY DEPT VISIT,LEVEL IV: ICD-10-PCS | Mod: 25,,, | Performed by: EMERGENCY MEDICINE

## 2020-05-26 PROCEDURE — 90471 IMMUNIZATION ADMIN: CPT | Performed by: EMERGENCY MEDICINE

## 2020-05-26 PROCEDURE — 90715 TDAP VACCINE 7 YRS/> IM: CPT | Performed by: EMERGENCY MEDICINE

## 2020-05-26 RX ORDER — LIDOCAINE HYDROCHLORIDE AND EPINEPHRINE 10; 10 MG/ML; UG/ML
10 INJECTION, SOLUTION INFILTRATION; PERINEURAL
Status: COMPLETED | OUTPATIENT
Start: 2020-05-26 | End: 2020-05-26

## 2020-05-26 RX ORDER — BACITRACIN 500 [USP'U]/G
OINTMENT TOPICAL
Status: COMPLETED | OUTPATIENT
Start: 2020-05-26 | End: 2020-05-26

## 2020-05-26 RX ORDER — BACITRACIN ZINC 500 UNIT/G
1 OINTMENT (GRAM) TOPICAL
Status: DISCONTINUED | OUTPATIENT
Start: 2020-05-26 | End: 2020-05-26

## 2020-05-26 RX ADMIN — LIDOCAINE HYDROCHLORIDE AND EPINEPHRINE 10 ML: 10; 10 INJECTION, SOLUTION INFILTRATION; PERINEURAL at 11:05

## 2020-05-26 RX ADMIN — CLOSTRIDIUM TETANI TOXOID ANTIGEN (FORMALDEHYDE INACTIVATED), CORYNEBACTERIUM DIPHTHERIAE TOXOID ANTIGEN (FORMALDEHYDE INACTIVATED), BORDETELLA PERTUSSIS TOXOID ANTIGEN (GLUTARALDEHYDE INACTIVATED), BORDETELLA PERTUSSIS FILAMENTOUS HEMAGGLUTININ ANTIGEN (FORMALDEHYDE INACTIVATED), BORDETELLA PERTUSSIS PERTACTIN ANTIGEN, AND BORDETELLA PERTUSSIS FIMBRIAE 2/3 ANTIGEN 0.5 ML: 5; 2; 2.5; 5; 3; 5 INJECTION, SUSPENSION INTRAMUSCULAR at 11:05

## 2020-05-26 RX ADMIN — BACITRACIN: 500 OINTMENT TOPICAL at 11:05

## 2020-05-27 VITALS
WEIGHT: 216 LBS | TEMPERATURE: 99 F | SYSTOLIC BLOOD PRESSURE: 136 MMHG | RESPIRATION RATE: 19 BRPM | HEIGHT: 72 IN | HEART RATE: 86 BPM | OXYGEN SATURATION: 99 % | BODY MASS INDEX: 29.26 KG/M2 | DIASTOLIC BLOOD PRESSURE: 84 MMHG

## 2020-05-27 PROCEDURE — 12002 RPR S/N/AX/GEN/TRNK2.6-7.5CM: CPT | Mod: LT

## 2020-05-27 PROCEDURE — 25000003 PHARM REV CODE 250: Performed by: EMERGENCY MEDICINE

## 2020-05-27 RX ORDER — AMOXICILLIN AND CLAVULANATE POTASSIUM 875; 125 MG/1; MG/1
1 TABLET, FILM COATED ORAL
Status: COMPLETED | OUTPATIENT
Start: 2020-05-27 | End: 2020-05-27

## 2020-05-27 RX ORDER — AMOXICILLIN AND CLAVULANATE POTASSIUM 875; 125 MG/1; MG/1
1 TABLET, FILM COATED ORAL 2 TIMES DAILY
Qty: 20 TABLET | Refills: 0 | Status: SHIPPED | OUTPATIENT
Start: 2020-05-27 | End: 2020-06-06

## 2020-05-27 RX ADMIN — AMOXICILLIN AND CLAVULANATE POTASSIUM 1 TABLET: 875; 125 TABLET, FILM COATED ORAL at 12:05

## 2020-05-27 NOTE — PROVIDER PROGRESS NOTES - EMERGENCY DEPT.
Emergency Department TeleTRIAGE Encounter Note      CHIEF COMPLAINT    Chief Complaint   Patient presents with    Animal Bite     pt got bitten by his dog 30mins ago. pt trying pull the chiken bone out of his mouth. pt sustained about 2cm laceration L forearm. dog updated with all vaccines       VITAL SIGNS   Initial Vitals [05/26/20 2139]   BP Pulse Resp Temp SpO2   (!) 141/87 105 19 98.8 °F (37.1 °C) 99 %      MAP       --            ALLERGIES    Review of patient's allergies indicates:  No Known Allergies    PROVIDER TRIAGE NOTE  Pt presents ambulatory c/o being bitten by his own dog while trying to prevent it from choking on chicken bones approx 1 hr PTA. Reports dog is UTD on shots. Pt reports having had Tetanus vaccine within past 10 years. C/o mild intermittent tingling to back of hand. Denies weakness or difficulty moving any fingers/portions of arm. Rates pain 6/10.  C/o laceration to forearm from dog bite approx several cm in length. Bleeding controlled per pt with bandage.      ORDERS  Labs Reviewed - No data to display    ED Orders (720h ago, onward)    None            Virtual Visit Note: The provider triage portion of this emergency department evaluation and documentation was performed via Link[employer], a HIPAA-compliant telemedicine application, in concert with a tele-presenter in the room. A face to face patient evaluation with one of my colleagues will occur once the patient is placed in an emergency department room.      DISCLAIMER: This note was prepared with [employer] voice recognition transcription software. Garbled syntax, mangled pronouns, and other bizarre constructions may be attributed to that software system.

## 2020-05-27 NOTE — DISCHARGE INSTRUCTIONS
Our goal in the emergency department is to always give you outstanding care and exceptional service. You may receive a survey by mail or e-mail in the next week regarding your experience in our ED. We would greatly appreciate your completing and returning the survey. Your feedback provides us with a way to recognize our staff who give very good care and it helps us learn how to improve when your experience was below our aspiration of excellence.     You may take Tylenol and/or ibuprofen as needed for the pain.  Keep your arm elevated.  This will also help with the pain.

## 2020-05-27 NOTE — ED PROVIDER NOTES
Encounter Date: 5/26/2020    SCRIBE #1 NOTE: I, Red Sarah , am scribing for, and in the presence of,  Sarah Lezama. I have scribed the entire note.   SCRIBE #2 NOTE: I, Brooke Beaver, am scribing for, and in the presence of,  Sarah Ramos MD. I have scribed the remaining portions of the note not scribed by Scribe #1.     History     Chief Complaint   Patient presents with    Animal Bite     pt got bitten by his dog 30mins ago. pt trying pull the chiken bone out of his mouth. pt sustained about 2cm laceration L forearm. dog updated with all vaccines     Time patient was seen by the provider: 11:24 PM      The patient is a 29 y.o. male with co-morbidities including: discoid lupus, who presents to the ED with a complaint of dog bite on his left forearm that occurred 20 mintutes prior to arrival. The patient tried to pull a chicken bone out of the dog's mouth when the dog bit him. His dog vaccines are up to date. Patient believes his last tetanus shot was 6 years ago, but he is not positive.  The patient complains of pain to the site of his laceration as well as his left hand. Denies numbness and tingling.     The history is provided by the patient and medical records.     Review of patient's allergies indicates:  No Known Allergies  Past Medical History:   Diagnosis Date    Allergy     seasonal    Asthma     Fever blister      History reviewed. No pertinent surgical history.  Family History   Problem Relation Age of Onset    Cancer Father         colon, 48    Asthma Brother     Cancer Maternal Aunt         brain cancer    Heart disease Paternal Uncle     Eczema Neg Hx     Lupus Neg Hx     Psoriasis Neg Hx     Melanoma Neg Hx      Social History     Tobacco Use    Smoking status: Former Smoker     Packs/day: 1.00     Years: 6.00     Pack years: 6.00     Types: Cigarettes    Smokeless tobacco: Never Used   Substance Use Topics    Alcohol use: Yes     Comment: 2 bottles at a time    Drug use:  No     Review of Systems   Constitutional: Negative for fever.   HENT: Positive for congestion and rhinorrhea.         Rhinorrhea and congestions are consistent with his history of seasonal allergies.    Respiratory: Negative for cough and shortness of breath.    Gastrointestinal: Negative for abdominal pain.   Musculoskeletal: Positive for arthralgias.        + occasional joint pain in hands when lupus flairs up, but states this only occurs when he is dehydrated. He reports he has been told to take steroid medication but uses herbal medications.   Skin: Positive for rash and wound (left forearm).        Rash on his face that is consistent with his history of discoid lupus       Physical Exam     Initial Vitals [05/26/20 2139]   BP Pulse Resp Temp SpO2   (!) 141/87 105 19 98.8 °F (37.1 °C) 99 %      MAP       --         Physical Exam    Nursing note and vitals reviewed.  Constitutional: He appears well-developed and well-nourished. No distress.   Appears anxious   HENT:   Head: Normocephalic and atraumatic.   Right Ear: External ear normal.   Left Ear: External ear normal.   Patient is wearing a mask.   Eyes: EOM are normal. Pupils are equal, round, and reactive to light.   Neck: Normal range of motion. Neck supple.   Cardiovascular: Regular rhythm and normal heart sounds. Tachycardia present.  Exam reveals no gallop and no friction rub.    No murmur heard.  Pulmonary/Chest: Effort normal and breath sounds normal. No respiratory distress. He has no wheezes. He has no rhonchi. He has no rales.   Abdominal: Soft. He exhibits no distension. There is no tenderness.   Musculoskeletal: Normal range of motion. He exhibits no tenderness.   3cm full thickness laceration to mid left forearm. Muscle and fascia is visible. Distally neurovascularly intact. Wrist and finger flexion and extension is intact.    Neurological: He is alert and oriented to person, place, and time. He has normal strength. No cranial nerve deficit or  sensory deficit.   Skin: Skin is warm and dry. Capillary refill takes less than 2 seconds.   Psychiatric: His behavior is normal. Thought content normal. His mood appears anxious.         ED Course   Lac Repair  Date/Time: 5/27/2020 12:40 AM  Performed by: Sarah Ramos MD  Authorized by: Sarah Ramos MD   Body area: upper extremity  Location details: left lower arm  Laceration length: 3 cm  Tendon involvement: none  Nerve involvement: none  Vascular damage: no  Anesthesia: local infiltration    Anesthesia:  Local Anesthetic: lidocaine 1% with epinephrine  Anesthetic total: 3 mL  Patient sedated: no  Preparation: Patient was prepped and draped in the usual sterile fashion.  Irrigation solution: saline  Irrigation method: jet lavage  Amount of cleaning: extensive  Debridement: minimal  Degree of undermining: none  Skin closure: 4-0 Prolene  Number of sutures: 3  Technique: simple  Approximation: loose  Approximation difficulty: simple  Dressing: antibiotic ointment  Patient tolerance: Patient tolerated the procedure well with no immediate complications  Comments: Wound was irrigated copiously.  Wound was closed loosely given that it is a dog bite.        Labs Reviewed - No data to display       Imaging Results    None          Medical Decision Making:   History:   Old Medical Records: I decided to obtain old medical records.  Old Records Summarized: records from clinic visits.  Initial Assessment:   Consulted the patient on plan to close the wound loosely after copious irrigation due to the risk of infection from wounds of dog bites. Patient expresses understanding. Will update his tetanus.            Scribe Attestation:   Scribe #1: I performed the above scribed service and the documentation accurately describes the services I performed. I attest to the accuracy of the note.  Scribe #2: I performed the above scribed service and the documentation accurately describes the services I performed. I  attest to the accuracy of the note.    Attending Attestation:           Physician Attestation for Scribe:      Comments: I, Dr. Sarah Ramos, personally performed the services described in this documentation. All medical record entries made by the scribe were at my direction and in my presence.  I have reviewed the chart and agree that the record reflects my personal performance and is accurate and complete. Sarah Ramos MD.        Attending ED Notes:   Patient was given his 1st dose of Augmentin here in the ED.  He will be discharged on a 10 day course of Augmentin.  He was advised to follow-up with his PCP in 2-3 days for recheck of the wound.  Given strict instructions to return for any signs of infection.                        Clinical Impression:       ICD-10-CM ICD-9-CM   1. Dog bite, initial encounter W54.0XXA 879.8     E906.0             ED Disposition Condition    Discharge Stable        ED Prescriptions     Medication Sig Dispense Start Date End Date Auth. Provider    amoxicillin-clavulanate 875-125mg (AUGMENTIN) 875-125 mg per tablet Take 1 tablet by mouth 2 (two) times daily. for 10 days 20 tablet 5/27/2020 6/6/2020 Sarah Ramos MD        Follow-up Information     Follow up With Specialties Details Why Contact Info    Vern Lew MD Internal Medicine In 2 days for recheck of your wound [address] [facility] [address]  [phone]                                       Sarah Ramos MD  05/27/20 0043

## 2020-05-27 NOTE — ED NOTES
Pt reports to ED after being bit by his dog. Pt reports trying to get bone out of his dogs mouth and dog bit his left arm. Pt has laceration to left arm; bleeding controlled. Pt reports dog is up to date on vaccines. Pt denies numbness/tingling, loss of sensation, SOB, dizziness, changes in vision, fever, chills, malaise.

## 2020-10-05 ENCOUNTER — PATIENT MESSAGE (OUTPATIENT)
Dept: ADMINISTRATIVE | Facility: HOSPITAL | Age: 30
End: 2020-10-05

## 2020-10-08 ENCOUNTER — TELEPHONE (OUTPATIENT)
Dept: INTERNAL MEDICINE | Facility: CLINIC | Age: 30
End: 2020-10-08

## 2020-10-08 ENCOUNTER — HOSPITAL ENCOUNTER (EMERGENCY)
Facility: OTHER | Age: 30
Discharge: HOME | End: 2020-10-08
Attending: EMERGENCY MEDICINE
Payer: COMMERCIAL

## 2020-10-08 VITALS
SYSTOLIC BLOOD PRESSURE: 148 MMHG | TEMPERATURE: 99 F | OXYGEN SATURATION: 98 % | HEIGHT: 72 IN | DIASTOLIC BLOOD PRESSURE: 90 MMHG | HEART RATE: 88 BPM | RESPIRATION RATE: 18 BRPM | BODY MASS INDEX: 31.83 KG/M2 | WEIGHT: 235 LBS

## 2020-10-08 DIAGNOSIS — G51.0 LEFT-SIDED BELL'S PALSY: Primary | ICD-10-CM

## 2020-10-08 DIAGNOSIS — R29.810 FACIAL DROOP: ICD-10-CM

## 2020-10-08 PROCEDURE — 99284 EMERGENCY DEPT VISIT MOD MDM: CPT

## 2020-10-08 PROCEDURE — 63600175 PHARM REV CODE 636 W HCPCS: Performed by: EMERGENCY MEDICINE

## 2020-10-08 PROCEDURE — 25000003 PHARM REV CODE 250: Performed by: EMERGENCY MEDICINE

## 2020-10-08 RX ORDER — VALACYCLOVIR HYDROCHLORIDE 500 MG/1
1000 TABLET, FILM COATED ORAL
Status: COMPLETED | OUTPATIENT
Start: 2020-10-08 | End: 2020-10-08

## 2020-10-08 RX ORDER — PREDNISONE 20 MG/1
60 TABLET ORAL
Status: COMPLETED | OUTPATIENT
Start: 2020-10-08 | End: 2020-10-08

## 2020-10-08 RX ORDER — VALACYCLOVIR HYDROCHLORIDE 1 G/1
1000 TABLET, FILM COATED ORAL 3 TIMES DAILY
Qty: 20 TABLET | Refills: 0 | Status: SHIPPED | OUTPATIENT
Start: 2020-10-08 | End: 2022-11-28

## 2020-10-08 RX ORDER — PREDNISONE 50 MG/1
50 TABLET ORAL DAILY
Qty: 6 TABLET | Refills: 0 | Status: SHIPPED | OUTPATIENT
Start: 2020-10-09 | End: 2020-10-15

## 2020-10-08 RX ADMIN — PREDNISONE 60 MG: 20 TABLET ORAL at 10:10

## 2020-10-08 RX ADMIN — VALACYCLOVIR HYDROCHLORIDE 1000 MG: 500 TABLET, FILM COATED ORAL at 10:10

## 2020-10-08 NOTE — ED TRIAGE NOTES
Pt presents to the ED w/ c/o left sided facial weakness onset yesterday AM.  Denies facial or extremity numbness/tingling.  Denies extremity weakness or pain.  Reports that left sided facial weakness has not improved nor worsened since onset yesterday.  Endorses nasal congestion x 1 week.  Diagnosed with lupus 2 years ago.  Denies headache, n, v, SOB, chest pain, change in vision.  Grandmother had stroke in her 60s.

## 2020-10-08 NOTE — TELEPHONE ENCOUNTER
----- Message from Jarret Serrato sent at 10/8/2020  7:59 AM CDT -----  Appointment Request From: Sathya Duggan    With Provider: Vern Lew MD [[address] [facility]]    Preferred Date Range: Any date 10/8/2020 or later    Preferred Times: Any Time    Reason for visit: Same Day Care    Comments:  Woke up with left side of face is numb. Need immediate attention according to web sear    [phone] (Home)   [phone] (Mobile) #Preferred

## 2020-10-08 NOTE — ED PROVIDER NOTES
Encounter Date: 10/8/2020    SCRIBE #1 NOTE: I, Aye Herrera, am scribing for, and in the presence of, Dr. Boggs.       History     Chief Complaint   Patient presents with    Facial Droop     L side facial weakness since yesterday upon waking. denies any other symptoms. reports headache yesterday morning.     Time seen by provider: 10:30 AM    This is a 29 y.o. male who presents with complaint of left-sided facial droop/weakness. He has had congestion for one week prior which he attributes to sleeping with a fan on in his room. He has had intermittent headaches after eating over-seasoned red beans and rice for the last few days with last episode two days ago; no headache since these symptoms began. He noticed that he had difficulty sucking a straw from the left side of his mouth yesterday, opening the left side of his mouth to chew food, and closing his left eye yesterday around lunchtime. He also had some left eye irritation and redness yesterday that has improved today. However, his other symptoms have not improved. He denies facial or oral numbness, extremity weakness, or gait problem. He denies change in hearing of the left ear. He is unsure of change in sense of taste it has not returned to baseline since he had loss of taste and smell several months ago. He does not recall congestion or myalgias at that time and did not have COVID-19 screening. No cough or shortness of breath. No fever. This is the extent of the patient's complaints at this time.    The history is provided by the patient.     Review of patient's allergies indicates:  No Known Allergies  Past Medical History:   Diagnosis Date    Allergy     seasonal    Asthma     Fever blister      History reviewed. No pertinent surgical history.  Family History   Problem Relation Age of Onset    Cancer Father         colon, 48    Asthma Brother     Cancer Maternal Aunt         brain cancer    Heart disease Paternal Uncle     Eczema Neg Hx      Lupus Neg Hx     Psoriasis Neg Hx     Melanoma Neg Hx      Social History     Tobacco Use    Smoking status: Former Smoker     Packs/day: 1.00     Years: 6.00     Pack years: 6.00     Types: Cigarettes    Smokeless tobacco: Never Used   Substance Use Topics    Alcohol use: Yes    Drug use: No     Review of Systems   Constitutional: Negative for fever.   HENT: Positive for congestion. Negative for hearing loss, sore throat and trouble swallowing.    Eyes: Positive for pain and redness.   Respiratory: Negative for cough and shortness of breath.    Cardiovascular: Negative for chest pain.   Gastrointestinal: Negative for nausea.   Genitourinary: Negative for dysuria.   Musculoskeletal: Negative for back pain and gait problem.   Skin: Negative for rash.   Neurological: Positive for facial asymmetry, weakness (facial) and headaches. Negative for numbness.        Negative for extremity weakness.       Physical Exam     Initial Vitals [10/08/20 0925]   BP Pulse Resp Temp SpO2   (!) 144/86 104 18 98.8 °F (37.1 °C) 100 %      MAP       --         Physical Exam    Nursing note and vitals reviewed.  Constitutional: He appears well-developed and well-nourished. He is not diaphoretic. No distress.   HENT:   Head: Normocephalic and atraumatic.   No conjunctival erythema on the left.   Eyes: EOM are normal. Pupils are equal, round, and reactive to light.   Neck: Normal range of motion. Neck supple.   Cardiovascular: Normal rate, regular rhythm and normal heart sounds. Exam reveals no gallop and no friction rub.    No murmur heard.  Pulmonary/Chest: Breath sounds normal. No respiratory distress. He has no wheezes. He has no rhonchi. He has no rales.   Musculoskeletal: No edema.   Neurological: He is alert and oriented to person, place, and time. A cranial nerve deficit is present. No sensory deficit. GCS score is 15. GCS eye subscore is 4. GCS verbal subscore is 5. GCS motor subscore is 6.   Left-sided facial weakness  including forehead. Able to close left eye.   Skin: Skin is warm and dry.   Psychiatric: He has a normal mood and affect. His behavior is normal. Judgment and thought content normal.         ED Course   Procedures  Labs Reviewed - No data to display            Medical Decision Making:   History:   Old Medical Records: I decided to obtain old medical records.  Initial Assessment:       29-year-old male with no known past medical history presents with acute onset of left facial weakness since yesterday.  Patient is difficulty sucking from a straw and closing his eye yesterday, which has persisted since then.  No associated patient numbness, extremity weakness or numbness, or other symptoms.  Patient has had mild intermittent nasal congestion for the past week, which he attributed to sleeping in a cold environment, with no associated fevers, cough, SOB.  He also has had intermittent headache over the past few days, which he attributed to eating a over salted food since it only occurred after eating it, but no headache for the past 2 days or currently.  No history of similar previous episodes.    On exam patient with mild left facial weakness including his forehead; he still has some left facial strength and able to close his eye but asymmetric compared to right side.  Sensation intact and no other neuro exam abnormalities.  Exam consistent with CN 7 dysfunction and left-sided Bell's palsy.  No indication for CT head or further emergent workup since no evidence for CVA or other associated symptoms.  Had extensive discussion with patient about likely diagnosis and expected course including possible worsening of symptoms in the next few weeks before improvement.  Will start prednisone and Valtrex for 1 week, and patient advised to get eye patch and artificial tears if he has difficulty closing his eye.  He is comfortable with discharge plan and will follow up with PCP or Neurology for reassessment and return to the ED for  any worsening or new symptoms.                Scribe Attestation:   Scribe #1: I performed the above scribed service and the documentation accurately describes the services I performed. I attest to the accuracy of the note.    Attending Attestation:           Physician Attestation for Scribe:  Physician Attestation Statement for Scribe #1: I, Dr. Boggs, reviewed documentation, as scribed by Aye Herrera in my presence, and it is both accurate and complete.                           Clinical Impression:       ICD-10-CM ICD-9-CM   1. Left-sided Bell's palsy  G51.0 351.0   2. Facial droop  R29.810 781.94                          ED Disposition Condition    Discharge Stable        ED Prescriptions     Medication Sig Dispense Start Date End Date Auth. Provider    predniSONE (DELTASONE) 50 MG Tab Take 1 tablet (50 mg total) by mouth once daily. for 6 days 6 tablet 10/9/2020 10/15/2020 Gary Boggs MD    valACYclovir (VALTREX) 1000 MG tablet Take 1 tablet (1,000 mg total) by mouth 3 (three) times daily. for 7 days 20 tablet 10/8/2020 10/15/2020 Gary Boggs MD        Follow-up Information     Follow up With Specialties Details Why Contact Info    Vern Lew MD Internal Medicine Schedule an appointment as soon as possible for a visit in 3 days  [address] [facility] [address]  [phone]      [facility] Go to  If symptoms worsen [address]  [facility] [id number] [phone]                                       Gary Boggs MD  10/09/20 0701

## 2020-10-08 NOTE — TELEPHONE ENCOUNTER
Spoke with pt - having numbness and tingling to the left face with left sided mouth and eye drooping. Advised to seek urgent medical attention. Pt states understanding

## 2020-10-13 ENCOUNTER — TELEPHONE (OUTPATIENT)
Dept: INTERNAL MEDICINE | Facility: CLINIC | Age: 30
End: 2020-10-13

## 2020-10-13 NOTE — TELEPHONE ENCOUNTER
Called pt and informed him that Dr Lew isn't here but I will send the message to him. He asked if he can have a refill on Prednisone and Valacyclovir because his appt isn't until 10/27

## 2020-10-13 NOTE — TELEPHONE ENCOUNTER
----- Message from Dannie Church sent at 10/13/2020 11:02 AM CDT -----  Type:  Needs Medical Advice    Who Called: Aleph  Symptoms (please be specific): pt has an appt on 10/27 but he is afraid he will run out of medication by then. Will Dr. Lew refill his medication before then?   How long has patient had these symptoms:  unknown  Pharmacy name and phone #:  n/a  Would the patient rather a call back or a response via [facility]? [facility]  Best Call Back Number: unknown  Additional Information: n/a

## 2020-10-13 NOTE — TELEPHONE ENCOUNTER
Hi, for his condition if it is [place] palsy we only recommend one round of prednisone and valtrex, so I would not recommend a refill at this time.  Let me know if patient has any more questions.  Thank you, Vern Lew

## 2020-10-27 ENCOUNTER — OFFICE VISIT (OUTPATIENT)
Dept: INTERNAL MEDICINE | Facility: CLINIC | Age: 30
End: 2020-10-27
Payer: COMMERCIAL

## 2020-10-27 ENCOUNTER — LAB VISIT (OUTPATIENT)
Dept: LAB | Facility: HOSPITAL | Age: 30
End: 2020-10-27
Attending: INTERNAL MEDICINE
Payer: COMMERCIAL

## 2020-10-27 VITALS
WEIGHT: 229.25 LBS | HEIGHT: 72 IN | OXYGEN SATURATION: 99 % | SYSTOLIC BLOOD PRESSURE: 122 MMHG | BODY MASS INDEX: 31.05 KG/M2 | DIASTOLIC BLOOD PRESSURE: 80 MMHG | HEART RATE: 80 BPM

## 2020-10-27 DIAGNOSIS — Z00.00 ROUTINE GENERAL MEDICAL EXAMINATION AT A HEALTH CARE FACILITY: ICD-10-CM

## 2020-10-27 DIAGNOSIS — Z00.00 ROUTINE GENERAL MEDICAL EXAMINATION AT A HEALTH CARE FACILITY: Primary | ICD-10-CM

## 2020-10-27 LAB
ALBUMIN SERPL BCP-MCNC: 4.4 G/DL (ref 3.5–5.2)
ALP SERPL-CCNC: 96 U/L (ref 55–135)
ALT SERPL W/O P-5'-P-CCNC: 12 U/L (ref 10–44)
ANION GAP SERPL CALC-SCNC: 7 MMOL/L (ref 8–16)
AST SERPL-CCNC: 19 U/L (ref 10–40)
BASOPHILS # BLD AUTO: 0.02 K/UL (ref 0–0.2)
BASOPHILS NFR BLD: 0.5 % (ref 0–1.9)
BILIRUB SERPL-MCNC: 0.7 MG/DL (ref 0.1–1)
BUN SERPL-MCNC: 10 MG/DL (ref 6–20)
CALCIUM SERPL-MCNC: 9.7 MG/DL (ref 8.7–10.5)
CHLORIDE SERPL-SCNC: 103 MMOL/L (ref 95–110)
CHOLEST SERPL-MCNC: 124 MG/DL (ref 120–199)
CHOLEST/HDLC SERPL: 3.4 {RATIO} (ref 2–5)
CO2 SERPL-SCNC: 27 MMOL/L (ref 23–29)
CREAT SERPL-MCNC: 1.7 MG/DL (ref 0.5–1.4)
DIFFERENTIAL METHOD: ABNORMAL
EOSINOPHIL # BLD AUTO: 0 K/UL (ref 0–0.5)
EOSINOPHIL NFR BLD: 1 % (ref 0–8)
ERYTHROCYTE [DISTWIDTH] IN BLOOD BY AUTOMATED COUNT: 13.1 % (ref 11.5–14.5)
EST. GFR  (AFRICAN AMERICAN): >60 ML/MIN/1.73 M^2
EST. GFR  (NON AFRICAN AMERICAN): 52.6 ML/MIN/1.73 M^2
GLUCOSE SERPL-MCNC: 84 MG/DL (ref 70–110)
HCT VFR BLD AUTO: 46.7 % (ref 40–54)
HDLC SERPL-MCNC: 37 MG/DL (ref 40–75)
HDLC SERPL: 29.8 % (ref 20–50)
HGB BLD-MCNC: 15.9 G/DL (ref 14–18)
IMM GRANULOCYTES # BLD AUTO: 0 K/UL (ref 0–0.04)
IMM GRANULOCYTES NFR BLD AUTO: 0 % (ref 0–0.5)
LDLC SERPL CALC-MCNC: 76.6 MG/DL (ref 63–159)
LYMPHOCYTES # BLD AUTO: 1.8 K/UL (ref 1–4.8)
LYMPHOCYTES NFR BLD: 45.2 % (ref 18–48)
MCH RBC QN AUTO: 32.4 PG (ref 27–31)
MCHC RBC AUTO-ENTMCNC: 34 G/DL (ref 32–36)
MCV RBC AUTO: 95 FL (ref 82–98)
MONOCYTES # BLD AUTO: 0.4 K/UL (ref 0.3–1)
MONOCYTES NFR BLD: 9.6 % (ref 4–15)
NEUTROPHILS # BLD AUTO: 1.7 K/UL (ref 1.8–7.7)
NEUTROPHILS NFR BLD: 43.7 % (ref 38–73)
NONHDLC SERPL-MCNC: 87 MG/DL
NRBC BLD-RTO: 0 /100 WBC
PLATELET # BLD AUTO: 250 K/UL (ref 150–350)
PMV BLD AUTO: 11.2 FL (ref 9.2–12.9)
POTASSIUM SERPL-SCNC: 4.7 MMOL/L (ref 3.5–5.1)
PROT SERPL-MCNC: 8.1 G/DL (ref 6–8.4)
RBC # BLD AUTO: 4.91 M/UL (ref 4.6–6.2)
SODIUM SERPL-SCNC: 137 MMOL/L (ref 136–145)
TRIGL SERPL-MCNC: 52 MG/DL (ref 30–150)
WBC # BLD AUTO: 3.94 K/UL (ref 3.9–12.7)

## 2020-10-27 PROCEDURE — 3008F BODY MASS INDEX DOCD: CPT | Mod: CPTII,S$GLB,, | Performed by: INTERNAL MEDICINE

## 2020-10-27 PROCEDURE — 99214 OFFICE O/P EST MOD 30 MIN: CPT | Mod: S$GLB,,, | Performed by: INTERNAL MEDICINE

## 2020-10-27 PROCEDURE — 80061 LIPID PANEL: CPT

## 2020-10-27 PROCEDURE — 85025 COMPLETE CBC W/AUTO DIFF WBC: CPT

## 2020-10-27 PROCEDURE — 99999 PR PBB SHADOW E&M-EST. PATIENT-LVL IV: CPT | Mod: PBBFAC,,, | Performed by: INTERNAL MEDICINE

## 2020-10-27 PROCEDURE — 99999 PR PBB SHADOW E&M-EST. PATIENT-LVL IV: ICD-10-PCS | Mod: PBBFAC,,, | Performed by: INTERNAL MEDICINE

## 2020-10-27 PROCEDURE — 36415 COLL VENOUS BLD VENIPUNCTURE: CPT

## 2020-10-27 PROCEDURE — 3008F PR BODY MASS INDEX (BMI) DOCUMENTED: ICD-10-PCS | Mod: CPTII,S$GLB,, | Performed by: INTERNAL MEDICINE

## 2020-10-27 PROCEDURE — 80053 COMPREHEN METABOLIC PANEL: CPT

## 2020-10-27 PROCEDURE — 99214 PR OFFICE/OUTPT VISIT, EST, LEVL IV, 30-39 MIN: ICD-10-PCS | Mod: S$GLB,,, | Performed by: INTERNAL MEDICINE

## 2020-10-27 NOTE — PROGRESS NOTES
"Chief Complaint  Chief Complaint   Patient presents with    Follow-up       HPI  Sathya Duggan is a 30 y.o. male with multiple medical diagnoses as listed in the medical history and problem list that presents for follow up after ED visit for left-sided bell's palsy.    Patient went to ED on 10/8/20 with left sided facial droop not sparing the forehead, also had HA that morning. Reported some congestion in week prior but attributed to sleeping with a fan on in his room. Intermittent Has after eating over-seasoned foods in the weeks prior. He also had left eye irritation and redness. He says he first noticed it when trying to use a drinking straw and unable to close the left side of his mouth fully. He reports eating a lot of shellfish the day before which he was allergic to as a child. He denied any numbness, extremity weakness, or gait problem. H says he has had a loss of taste and smell since February but it has slowly improved. He believes he may have been exposed to COVID in February. He was evaluated in the ED and given Valtrex 1000 mg TID for 7 days and Prednisone 50 mg x6 days.    -Lupus- was diagnosed last year, bloodwork only remarkable for VZVIg, may be related to bell's palsy. Has not followed up with rheumatology since, well controlled, no skin changes or joint pains recently.  -Allergy and Asthma- no longer needing an inhaler, feels he "outgrew it as a child"    Only taking Chantix for smoking cessation, 10 pack year history, has not smoked since March.    His father  of colon cancer at age 48, would like to know his risk and if he should get a colonoscopy soon.      PAST MEDICAL HISTORY:  Past Medical History:   Diagnosis Date    Allergy     seasonal    Asthma     Fever blister        PAST SURGICAL HISTORY:  No past surgical history on file.    SOCIAL HISTORY:  Social History     Socioeconomic History    Marital status: Single     Spouse name: Not on file    Number of children: Not on file    " Years of education: Not on file    Highest education level: Not on file   Occupational History     Employer: [employer]   Social Needs    Financial resource strain: Not on file    Food insecurity     Worry: Not on file     Inability: Not on file    Transportation needs     Medical: Not on file     Non-medical: Not on file   Tobacco Use    Smoking status: Former Smoker     Packs/day: 1.00     Years: 10.00     Pack years: 10.00     Types: Cigarettes    Smokeless tobacco: Never Used   Substance and Sexual Activity    Alcohol use: Yes    Drug use: No    Sexual activity: Yes     Partners: Female   Lifestyle    Physical activity     Days per week: Not on file     Minutes per session: Not on file    Stress: Not on file   Relationships    Social connections     Talks on phone: Not on file     Gets together: Not on file     Attends Yazidi service: Not on file     Active member of club or organization: Not on file     Attends meetings of clubs or organizations: Not on file     Relationship status: Not on file   Other Topics Concern    Not on file   Social History Narrative    . [employer]. Lives with GF, baby due 2/21.    No kids.    Goes to gym 4 times per week.           FAMILY HISTORY:  Family History   Problem Relation Age of Onset    Cancer Father         colon, 48    Asthma Brother     Cancer Maternal Aunt         brain cancer    Heart disease Paternal Uncle     Eczema Neg Hx     Lupus Neg Hx     Psoriasis Neg Hx     Melanoma Neg Hx        ALLERGIES AND MEDICATIONS: updated and reviewed.  Review of patient's allergies indicates:  No Known Allergies  Current Outpatient Medications   Medication Sig Dispense Refill    ACZONE 7.5 % GlwP AAA face daily - bid 60 g 3    augmented betamethasone dipropionate (DIPROLENE AF) 0.05 % cream AAA scabbed lesions to face BID 45 g 0    benzoyl peroxide (BENZEPRO) 6 % Towl Use daily in shower to trunk/neck 1 each 5    CHANTIX 1  mg Tab TAKE 1 TABLET(1 MG) BY MOUTH TWICE DAILY 60 tablet 11    doxycycline monohydrate 100 mg Tab Take 1 po qday 30 tablet 2    EPIDUO FORTE 0.3-2.5 % GlwP AAA face qhs 45 g 3    hydroxychloroquine (PLAQUENIL) 200 mg tablet Take 1 tablet (200 mg total) by mouth 2 (two) times daily. 60 tablet 3    ketoconazole (NIZORAL) 2 % cream AAA bid to left ear 30 g 3    traZODone (DESYREL) 50 MG tablet TAKE 1 TABLET(50 MG) BY MOUTH EVERY NIGHT AS NEEDED FOR INSOMNIA 30 tablet 5    valACYclovir (VALTREX) 1000 MG tablet Take 1 tablet (1,000 mg total) by mouth 3 (three) times daily. for 7 days 20 tablet 0     No current facility-administered medications for this visit.          ROS  Review of Systems   Constitutional: Negative for chills, fatigue and fever.   HENT: Negative for congestion, hearing loss, rhinorrhea, sinus pressure, sinus pain and tinnitus.    Eyes: Positive for discharge and redness. Negative for pain and itching.   Respiratory: Negative for cough, chest tightness, shortness of breath and wheezing.    Cardiovascular: Negative for chest pain, palpitations and leg swelling.   Gastrointestinal: Negative for abdominal distention, abdominal pain, blood in stool, constipation, diarrhea, nausea and vomiting.   Genitourinary: Negative for dysuria, frequency and urgency.   Musculoskeletal: Negative for arthralgias, joint swelling, myalgias and neck pain.   Skin: Negative for color change and rash.   Neurological: Positive for facial asymmetry, weakness and headaches. Negative for dizziness, tremors, seizures and speech difficulty.           Physical Exam  Vitals:    10/27/20 1020   BP: 122/80   BP Location: Right arm   Patient Position: Sitting   BP Method: Large (Manual)   Pulse: 80   SpO2: 99%   Weight: 104 kg (229 lb 4.5 oz)   Height: 6' (1.829 m)    Body mass index is 31.1 kg/m².  Weight: 104 kg (229 lb 4.5 oz)   Height: 6' (182.9 cm)   Physical Exam  Constitutional:       Appearance: Normal appearance. He is  normal weight.   HENT:      Head: Normocephalic and atraumatic.      Nose: Nose normal. No congestion or rhinorrhea.      Mouth/Throat:      Mouth: Mucous membranes are moist.      Pharynx: Oropharynx is clear.   Eyes:      General:         Right eye: No discharge.         Left eye: No discharge.      Extraocular Movements: Extraocular movements intact.      Conjunctiva/sclera: Conjunctivae normal.      Pupils: Pupils are equal, round, and reactive to light.   Neck:      Musculoskeletal: Normal range of motion.   Cardiovascular:      Rate and Rhythm: Normal rate and regular rhythm.      Pulses: Normal pulses.      Heart sounds: Normal heart sounds. No murmur. No friction rub. No gallop.    Pulmonary:      Effort: Pulmonary effort is normal. No respiratory distress.      Breath sounds: Normal breath sounds. No wheezing, rhonchi or rales.   Abdominal:      General: Abdomen is flat. Bowel sounds are normal. There is no distension.      Palpations: Abdomen is soft.      Tenderness: There is no abdominal tenderness. There is no guarding.   Musculoskeletal: Normal range of motion.         General: No swelling.   Skin:     General: Skin is warm and dry.      Capillary Refill: Capillary refill takes less than 2 seconds.      Findings: No rash.   Neurological:      General: No focal deficit present.      Mental Status: He is alert and oriented to person, place, and time.      Cranial Nerves: No cranial nerve deficit.      Motor: No weakness.   Psychiatric:         Mood and Affect: Mood normal.         Behavior: Behavior normal.           Health Maintenance       Date Due Completion Date    Lipid Panel 1989 ---    Influenza Vaccine (1) 08/01/2020 ---    TETANUS VACCINE 05/26/2030 5/26/2020            Assessment and Plan:]  31 y/o M with left sided bell's palsy, unclear etiology, possibly VZV reactivation or Lupus related    Bell's Palsy  -Treated in ED with Valtrex and Prednisone, improved greatly  -Discussed the  pathophysiology of bell's palsy with the patient, voiced understanding    Lupus  -Diagnosed last year, was seen by rheumatology  -No exacerbations, will follow up with Rheumatology if he has flare ups    Routine general medical examination at a health care facility  -     CBC auto differential; Future; Expected date: 10/27/2020  -     Comprehensive Metabolic Panel; Future; Expected date: 10/27/2020  -     Lipid Panel; Future; Expected date: 10/27/2020      Mark Anthony Alonso, MS4  [employer] [employer]  [facility]    I hereby acknowledge that I am relying upon documentation authored by a medical student working under my supervision and further I hereby attest that I have verified the student documentation or findings by personally performing the physical exam and medical decision making activities of the Evaluation and Management service to be billed.  Vern Lew    Exam shows slight L sided facial droop, but nml strength and he denies any ocual dryness    Declined flu vax

## 2022-03-16 ENCOUNTER — PATIENT MESSAGE (OUTPATIENT)
Dept: ADMINISTRATIVE | Facility: HOSPITAL | Age: 32
End: 2022-03-16
Payer: COMMERCIAL

## 2022-05-19 ENCOUNTER — PATIENT OUTREACH (OUTPATIENT)
Dept: ADMINISTRATIVE | Facility: HOSPITAL | Age: 32
End: 2022-05-19
Payer: COMMERCIAL

## 2022-05-19 NOTE — PROGRESS NOTES
Health Maintenance Due   Topic Date Due    COVID-19 Vaccine (3 - Booster for Pfizer series) 09/21/2021     Triggered LINKS & reconciled immunizations. Updated Care Everywhere. Checked for outside lab results in [facility]; no results found. Portal message sent asking pt to schedule annual visit with Dr. Lew or update PCP. Chart review completed.

## 2022-11-28 ENCOUNTER — OFFICE VISIT (OUTPATIENT)
Dept: INTERNAL MEDICINE | Facility: CLINIC | Age: 32
End: 2022-11-28
Payer: COMMERCIAL

## 2022-11-28 VITALS
WEIGHT: 238.13 LBS | DIASTOLIC BLOOD PRESSURE: 88 MMHG | BODY MASS INDEX: 32.25 KG/M2 | SYSTOLIC BLOOD PRESSURE: 140 MMHG | RESPIRATION RATE: 18 BRPM | HEART RATE: 105 BPM | HEIGHT: 72 IN | OXYGEN SATURATION: 98 %

## 2022-11-28 DIAGNOSIS — F41.9 ANXIETY: Primary | ICD-10-CM

## 2022-11-28 DIAGNOSIS — Z78.9 UNCIRCUMCISED MALE: ICD-10-CM

## 2022-11-28 PROCEDURE — 3077F SYST BP >= 140 MM HG: CPT | Mod: CPTII,S$GLB,, | Performed by: INTERNAL MEDICINE

## 2022-11-28 PROCEDURE — 99999 PR PBB SHADOW E&M-EST. PATIENT-LVL V: CPT | Mod: PBBFAC,,, | Performed by: INTERNAL MEDICINE

## 2022-11-28 PROCEDURE — 3008F BODY MASS INDEX DOCD: CPT | Mod: CPTII,S$GLB,, | Performed by: INTERNAL MEDICINE

## 2022-11-28 PROCEDURE — 3008F PR BODY MASS INDEX (BMI) DOCUMENTED: ICD-10-PCS | Mod: CPTII,S$GLB,, | Performed by: INTERNAL MEDICINE

## 2022-11-28 PROCEDURE — 1160F RVW MEDS BY RX/DR IN RCRD: CPT | Mod: CPTII,S$GLB,, | Performed by: INTERNAL MEDICINE

## 2022-11-28 PROCEDURE — 1159F PR MEDICATION LIST DOCUMENTED IN MEDICAL RECORD: ICD-10-PCS | Mod: CPTII,S$GLB,, | Performed by: INTERNAL MEDICINE

## 2022-11-28 PROCEDURE — 3077F PR MOST RECENT SYSTOLIC BLOOD PRESSURE >= 140 MM HG: ICD-10-PCS | Mod: CPTII,S$GLB,, | Performed by: INTERNAL MEDICINE

## 2022-11-28 PROCEDURE — 1159F MED LIST DOCD IN RCRD: CPT | Mod: CPTII,S$GLB,, | Performed by: INTERNAL MEDICINE

## 2022-11-28 PROCEDURE — 1160F PR REVIEW ALL MEDS BY PRESCRIBER/CLIN PHARMACIST DOCUMENTED: ICD-10-PCS | Mod: CPTII,S$GLB,, | Performed by: INTERNAL MEDICINE

## 2022-11-28 PROCEDURE — 99214 OFFICE O/P EST MOD 30 MIN: CPT | Mod: S$GLB,,, | Performed by: INTERNAL MEDICINE

## 2022-11-28 PROCEDURE — 99999 PR PBB SHADOW E&M-EST. PATIENT-LVL V: ICD-10-PCS | Mod: PBBFAC,,, | Performed by: INTERNAL MEDICINE

## 2022-11-28 PROCEDURE — 99214 PR OFFICE/OUTPT VISIT, EST, LEVL IV, 30-39 MIN: ICD-10-PCS | Mod: S$GLB,,, | Performed by: INTERNAL MEDICINE

## 2022-11-28 PROCEDURE — 3079F DIAST BP 80-89 MM HG: CPT | Mod: CPTII,S$GLB,, | Performed by: INTERNAL MEDICINE

## 2022-11-28 PROCEDURE — 3079F PR MOST RECENT DIASTOLIC BLOOD PRESSURE 80-89 MM HG: ICD-10-PCS | Mod: CPTII,S$GLB,, | Performed by: INTERNAL MEDICINE

## 2022-11-28 RX ORDER — ALPRAZOLAM 0.25 MG/1
0.25 TABLET ORAL 2 TIMES DAILY PRN
Qty: 20 TABLET | Refills: 0 | Status: SHIPPED | OUTPATIENT
Start: 2022-11-28

## 2022-11-28 NOTE — PATIENT INSTRUCTIONS
Try claritin or zyrtec antihistamine for sinus congestion    Progressive Relaxation  Your body needs relaxation to reduce stress and undo the fight-or-flight response. It helps to plan for 20 minutes of relaxation every day. This is time for yourself. Sit or lie comfortably. Limit distractions like phones. Listen to soft music or just sit in silence. And try the relaxation technique below.    Progressive Relaxation  Progressive relaxation helps your whole body relax. To try this technique, follow these steps:  Find a quiet room. Sit in a comfortable chair or lie on your back.  Inhale deeply to a slow count of 5. Feel your belly, chest, and back expand. Exhale slowly to a count of 5.  After a few minutes, inhale deeply. Tighten the muscles in your feet. Notice how it feels. Hold the tension for 3 seconds.  Exhale while relaxing the tightened muscles. Notice how relaxed you feel.  Repeat steps 3 and 4 with another muscle group. You can progress from your feet, calves, and thighs to your stomach, arms, and hands.  Remember the 4 As  Avoid a stressor. If someone is smoking when youre trying to quit, leave the room.  Accept a stressor you cant change, like a job loss, by knowing that your feelings are normal.  Alter how you deal with a stressor. If a constantly ringing phone is a stressor, let the answering machine .  Adapt to some stressors. When starting a new exercise program, instead of focusing on how hard it will be, think how good you will feel.  © 2999-1129 The "[employer]. [address] [place] [address], [place] [address]. All rights reserved. This information is not intended as a substitute for professional medical care. Always follow your healthcare professional's instructions.

## 2022-11-28 NOTE — PROGRESS NOTES
.Subjective:       Patient ID: Sathya Duggan is a 32 y.o. male.    Chief Complaint: Anxiety (Stress )    Patient is here for followup for chronic conditions.      Has had worsened stress and anxiety. Very stressed with work and family. Has felt anxiety for 3 years. Denies feeling depressed. Connects well with his son. Doing well with work. Family stress partially from when his mother recently had a stroke and he needed top care for his disabled brother. Mother doing btr after the stroke.    Review of Systems   Constitutional:  Negative for activity change and unexpected weight change.   Psychiatric/Behavioral:  Positive for sleep disturbance. Negative for behavioral problems, confusion, decreased concentration, self-injury and suicidal ideas. The patient is nervous/anxious.          Objective:      Physical Exam  Constitutional:       General: He is not in acute distress.     Appearance: He is well-developed. He is not diaphoretic.      Comments: Well appearing, breathing comfortably, speaking full sentences, no coughing during telemed encounter   HENT:      Head: Normocephalic and atraumatic.   Pulmonary:      Effort: Pulmonary effort is normal. No respiratory distress.   Neurological:      Mental Status: He is alert and oriented to person, place, and time.   Psychiatric:         Behavior: Behavior normal.         Thought Content: Thought content normal.         Judgment: Judgment normal.       Assessment:       1. Anxiety    2. Uncircumcised male        Plan:       Sathya was seen today for anxiety.    Diagnoses and all orders for this visit:    Anxiety  -     Ambulatory referral/consult to Primary Care Behavioral Health (Non-Opioids); Future  -     ALPRAZolam (XANAX) 0.25 MG tablet; Take 1 tablet (0.25 mg total) by mouth 2 (two) times daily as needed for Anxiety.  He declines daily pill. He understands to minimize xanax use  discussed and educated on relaxation breathing and given handout      Uncircumcised  male  -     Ambulatory referral/consult to Urology; Future    Has had some elevated pressures -- review again next time      Health Maintenance         Date Due Completion Date    COVID-19 Vaccine (3 - Booster for Pfizer series) 06/16/2021 4/21/2021    Influenza Vaccine (1) Never done ---    TETANUS VACCINE 05/26/2030 5/26/2020            Follow up in about 3 months (around 2/28/2023).    Future Appointments   Date Time Provider Department Center   12/5/2022  7:15 AM Amos Gifford Jr., MD [facility]   2/28/2023  8:00 AM Vern Lew MD [facility]

## 2022-12-05 ENCOUNTER — OFFICE VISIT (OUTPATIENT)
Dept: UROLOGY | Facility: CLINIC | Age: 32
End: 2022-12-05
Payer: COMMERCIAL

## 2022-12-05 ENCOUNTER — TELEPHONE (OUTPATIENT)
Dept: UROLOGY | Facility: CLINIC | Age: 32
End: 2022-12-05
Payer: COMMERCIAL

## 2022-12-05 VITALS
HEART RATE: 101 BPM | BODY MASS INDEX: 31.83 KG/M2 | HEIGHT: 72 IN | SYSTOLIC BLOOD PRESSURE: 141 MMHG | WEIGHT: 235 LBS | DIASTOLIC BLOOD PRESSURE: 91 MMHG

## 2022-12-05 DIAGNOSIS — N47.1 PHIMOSIS: Primary | ICD-10-CM

## 2022-12-05 DIAGNOSIS — Z78.9 UNCIRCUMCISED MALE: ICD-10-CM

## 2022-12-05 PROCEDURE — 3080F PR MOST RECENT DIASTOLIC BLOOD PRESSURE >= 90 MM HG: ICD-10-PCS | Mod: CPTII,S$GLB,, | Performed by: UROLOGY

## 2022-12-05 PROCEDURE — 3077F PR MOST RECENT SYSTOLIC BLOOD PRESSURE >= 140 MM HG: ICD-10-PCS | Mod: CPTII,S$GLB,, | Performed by: UROLOGY

## 2022-12-05 PROCEDURE — 3008F PR BODY MASS INDEX (BMI) DOCUMENTED: ICD-10-PCS | Mod: CPTII,S$GLB,, | Performed by: UROLOGY

## 2022-12-05 PROCEDURE — 99999 PR PBB SHADOW E&M-EST. PATIENT-LVL IV: ICD-10-PCS | Mod: PBBFAC,,, | Performed by: UROLOGY

## 2022-12-05 PROCEDURE — 1160F PR REVIEW ALL MEDS BY PRESCRIBER/CLIN PHARMACIST DOCUMENTED: ICD-10-PCS | Mod: CPTII,S$GLB,, | Performed by: UROLOGY

## 2022-12-05 PROCEDURE — 99203 PR OFFICE/OUTPT VISIT, NEW, LEVL III, 30-44 MIN: ICD-10-PCS | Mod: S$GLB,,, | Performed by: UROLOGY

## 2022-12-05 PROCEDURE — 99203 OFFICE O/P NEW LOW 30 MIN: CPT | Mod: S$GLB,,, | Performed by: UROLOGY

## 2022-12-05 PROCEDURE — 1160F RVW MEDS BY RX/DR IN RCRD: CPT | Mod: CPTII,S$GLB,, | Performed by: UROLOGY

## 2022-12-05 PROCEDURE — 1159F MED LIST DOCD IN RCRD: CPT | Mod: CPTII,S$GLB,, | Performed by: UROLOGY

## 2022-12-05 PROCEDURE — 1159F PR MEDICATION LIST DOCUMENTED IN MEDICAL RECORD: ICD-10-PCS | Mod: CPTII,S$GLB,, | Performed by: UROLOGY

## 2022-12-05 PROCEDURE — 99999 PR PBB SHADOW E&M-EST. PATIENT-LVL IV: CPT | Mod: PBBFAC,,, | Performed by: UROLOGY

## 2022-12-05 PROCEDURE — 3080F DIAST BP >= 90 MM HG: CPT | Mod: CPTII,S$GLB,, | Performed by: UROLOGY

## 2022-12-05 PROCEDURE — 3077F SYST BP >= 140 MM HG: CPT | Mod: CPTII,S$GLB,, | Performed by: UROLOGY

## 2022-12-05 PROCEDURE — 3008F BODY MASS INDEX DOCD: CPT | Mod: CPTII,S$GLB,, | Performed by: UROLOGY

## 2022-12-05 NOTE — PROGRESS NOTES
Subjective:       Patient ID: Sathya Duggan is a 32 y.o. male.    Chief Complaint: new patient (Consult to discuss circumcision.)    HPI   patient is here for possible circumcision.  He has a tight frenulum which tears and this has happened several times over the years.  No anticoagulants.  Patient is healthy.    Past Medical History:   Diagnosis Date    Allergy     seasonal    Asthma     Fever blister        No past surgical history on file.    Family History   Problem Relation Age of Onset    Heart disease Mother 65        MI    Cancer Father         colon, 48    Asthma Brother     Cancer Maternal Aunt         brain cancer    Heart disease Paternal Uncle     Eczema Neg Hx     Lupus Neg Hx     Psoriasis Neg Hx     Melanoma Neg Hx        Social History     Socioeconomic History    Marital status: Single   Occupational History     Employer: [employer]   Tobacco Use    Smoking status: Former     Packs/day: 1.00     Years: 10.00     Pack years: 10.00     Types: Cigarettes, Vaping with nicotine     Start date: 12/5/2021    Smokeless tobacco: Never   Substance and Sexual Activity    Alcohol use: Yes    Drug use: No    Sexual activity: Yes     Partners: Female   Social History Narrative    . [employer]. Lives with fiance (? Colitis) and baby son born 2/21.               Allergies:  Patient has no known allergies.    Medications:    Current Outpatient Medications:     ALPRAZolam (XANAX) 0.25 MG tablet, Take 1 tablet (0.25 mg total) by mouth 2 (two) times daily as needed for Anxiety., Disp: 20 tablet, Rfl: 0    Review of Systems   Constitutional:  Negative for activity change, appetite change, chills, diaphoresis, fatigue, fever and unexpected weight change.   HENT:  Negative for congestion, dental problem, hearing loss, mouth sores, postnasal drip, rhinorrhea, sinus pressure and trouble swallowing.    Eyes:  Negative for pain, discharge and itching.   Respiratory:  Negative for apnea, cough,  choking, chest tightness, shortness of breath and wheezing.    Cardiovascular:  Negative for chest pain, palpitations and leg swelling.   Gastrointestinal:  Negative for abdominal distention, abdominal pain, anal bleeding, blood in stool, constipation, diarrhea, nausea, rectal pain and vomiting.   Endocrine: Negative for polydipsia and polyuria.   Genitourinary:  Negative for decreased urine volume, difficulty urinating, dysuria, enuresis, flank pain, frequency, genital sores, hematuria, penile discharge, penile pain, penile swelling, scrotal swelling, testicular pain and urgency.   Musculoskeletal:  Negative for arthralgias, back pain and myalgias.   Skin:  Negative for color change, rash and wound.   Neurological:  Negative for dizziness, syncope, speech difficulty, light-headedness and headaches.   Hematological:  Negative for adenopathy. Does not bruise/bleed easily.   Psychiatric/Behavioral:  Negative for behavioral problems, confusion, hallucinations and sleep disturbance.      Objective:      Physical Exam  Constitutional:       Appearance: He is well-developed.   HENT:      Head: Normocephalic.   Cardiovascular:      Rate and Rhythm: Normal rate.   Pulmonary:      Effort: Pulmonary effort is normal.   Abdominal:      Palpations: Abdomen is soft.   Genitourinary:     Comments: Tight frenulum mildly  phimotic foreskin  Skin:     General: Skin is warm.   Neurological:      Mental Status: He is alert.       Assessment:       1. Phimosis    2. Uncircumcised male          Plan:       Sathya was seen today for new patient.    Diagnoses and all orders for this visit:    Phimosis    Uncircumcised male  -     Ambulatory referral/consult to Urology    The patient is scheduled for a circumcision.  The risks and benefits were explained to the patient in detail and consent was obtained.  The risks include but are not limited to bleeding, infection, pain,  fistula formation (hole in the urine tube), meatal stenosis  (ulceration/scarring at the tip of the penis, skin bridge, removal of too much or too little skin, buried penis, penile swelling of discomfort, infection of incision or bleeding (usually mild) that may result in a hospital treatment.  The Patient was given the alternatives of observation and medical therapy as well. The patient understands these risks and has agreed to proceed with surgery.          Patient would like to have his scheduled in late February

## 2022-12-22 ENCOUNTER — PATIENT MESSAGE (OUTPATIENT)
Dept: BEHAVIORAL HEALTH | Facility: CLINIC | Age: 32
End: 2022-12-22
Payer: COMMERCIAL

## 2022-12-22 ENCOUNTER — TELEPHONE (OUTPATIENT)
Dept: BEHAVIORAL HEALTH | Facility: CLINIC | Age: 32
End: 2022-12-22
Payer: COMMERCIAL

## 2023-01-04 ENCOUNTER — TELEPHONE (OUTPATIENT)
Dept: BEHAVIORAL HEALTH | Facility: CLINIC | Age: 33
End: 2023-01-04
Payer: COMMERCIAL

## 2023-01-05 NOTE — PROGRESS NOTES
Patient was referred to the [facility] Non Opioid program by PCP.  CHW tired to reach out to patient a total of three times.  Patient referral was removed from the [facility] program.  CHW sent follow up message to patient's PCP via [employer].

## 2023-02-18 ENCOUNTER — PATIENT MESSAGE (OUTPATIENT)
Dept: SURGERY | Facility: HOSPITAL | Age: 33
End: 2023-02-18
Payer: COMMERCIAL

## 2023-02-23 ENCOUNTER — TELEPHONE (OUTPATIENT)
Dept: UROLOGY | Facility: CLINIC | Age: 33
End: 2023-02-23
Payer: COMMERCIAL

## 2023-02-23 NOTE — TELEPHONE ENCOUNTER
Called pt to confirm arrival time of 9:30am for procedure on 02-. Gave pt NPO instructions and gave pt opportunity to ask questions. Pt verbalized understanding.

## 2023-02-23 NOTE — TELEPHONE ENCOUNTER
Called pt to confirm arrival time of 9:30am for surgery on 02-.  Left detailed message including location and surgery instructions.

## 2023-02-28 ENCOUNTER — LAB VISIT (OUTPATIENT)
Dept: LAB | Facility: HOSPITAL | Age: 33
End: 2023-02-28
Attending: INTERNAL MEDICINE
Payer: COMMERCIAL

## 2023-02-28 ENCOUNTER — OFFICE VISIT (OUTPATIENT)
Dept: INTERNAL MEDICINE | Facility: CLINIC | Age: 33
End: 2023-02-28
Payer: COMMERCIAL

## 2023-02-28 VITALS
OXYGEN SATURATION: 99 % | RESPIRATION RATE: 18 BRPM | HEIGHT: 72 IN | BODY MASS INDEX: 32.73 KG/M2 | WEIGHT: 241.63 LBS | HEART RATE: 101 BPM | DIASTOLIC BLOOD PRESSURE: 90 MMHG | SYSTOLIC BLOOD PRESSURE: 135 MMHG

## 2023-02-28 DIAGNOSIS — Z00.00 ROUTINE GENERAL MEDICAL EXAMINATION AT A HEALTH CARE FACILITY: ICD-10-CM

## 2023-02-28 DIAGNOSIS — Z00.00 ROUTINE GENERAL MEDICAL EXAMINATION AT A HEALTH CARE FACILITY: Primary | ICD-10-CM

## 2023-02-28 DIAGNOSIS — R03.0 ELEVATED SYSTOLIC BLOOD PRESSURE READING WITHOUT DIAGNOSIS OF HYPERTENSION: ICD-10-CM

## 2023-02-28 LAB
ALBUMIN SERPL BCP-MCNC: 3.9 G/DL (ref 3.5–5.2)
ALP SERPL-CCNC: 103 U/L (ref 55–135)
ALT SERPL W/O P-5'-P-CCNC: 49 U/L (ref 10–44)
ANION GAP SERPL CALC-SCNC: 8 MMOL/L (ref 8–16)
AST SERPL-CCNC: 42 U/L (ref 10–40)
BASOPHILS # BLD AUTO: 0.04 K/UL (ref 0–0.2)
BASOPHILS NFR BLD: 0.8 % (ref 0–1.9)
BILIRUB SERPL-MCNC: 0.7 MG/DL (ref 0.1–1)
BILIRUB UR QL STRIP: NEGATIVE
BUN SERPL-MCNC: 12 MG/DL (ref 6–20)
CALCIUM SERPL-MCNC: 9 MG/DL (ref 8.7–10.5)
CHLORIDE SERPL-SCNC: 106 MMOL/L (ref 95–110)
CLARITY UR REFRACT.AUTO: CLEAR
CO2 SERPL-SCNC: 24 MMOL/L (ref 23–29)
COLOR UR AUTO: YELLOW
CREAT SERPL-MCNC: 1.3 MG/DL (ref 0.5–1.4)
DIFFERENTIAL METHOD: ABNORMAL
EOSINOPHIL # BLD AUTO: 0.2 K/UL (ref 0–0.5)
EOSINOPHIL NFR BLD: 3.6 % (ref 0–8)
ERYTHROCYTE [DISTWIDTH] IN BLOOD BY AUTOMATED COUNT: 12.9 % (ref 11.5–14.5)
EST. GFR  (NO RACE VARIABLE): >60 ML/MIN/1.73 M^2
GLUCOSE SERPL-MCNC: 85 MG/DL (ref 70–110)
GLUCOSE UR QL STRIP: NEGATIVE
HCT VFR BLD AUTO: 43.9 % (ref 40–54)
HGB BLD-MCNC: 14.7 G/DL (ref 14–18)
HGB UR QL STRIP: NEGATIVE
IMM GRANULOCYTES # BLD AUTO: 0.01 K/UL (ref 0–0.04)
IMM GRANULOCYTES NFR BLD AUTO: 0.2 % (ref 0–0.5)
KETONES UR QL STRIP: NEGATIVE
LEUKOCYTE ESTERASE UR QL STRIP: NEGATIVE
LYMPHOCYTES # BLD AUTO: 1.7 K/UL (ref 1–4.8)
LYMPHOCYTES NFR BLD: 33.7 % (ref 18–48)
MCH RBC QN AUTO: 31.1 PG (ref 27–31)
MCHC RBC AUTO-ENTMCNC: 33.5 G/DL (ref 32–36)
MCV RBC AUTO: 93 FL (ref 82–98)
MONOCYTES # BLD AUTO: 0.5 K/UL (ref 0.3–1)
MONOCYTES NFR BLD: 9.3 % (ref 4–15)
NEUTROPHILS # BLD AUTO: 2.7 K/UL (ref 1.8–7.7)
NEUTROPHILS NFR BLD: 52.4 % (ref 38–73)
NITRITE UR QL STRIP: NEGATIVE
NRBC BLD-RTO: 0 /100 WBC
PH UR STRIP: 6 [PH] (ref 5–8)
PLATELET # BLD AUTO: 309 K/UL (ref 150–450)
PMV BLD AUTO: 10.2 FL (ref 9.2–12.9)
POTASSIUM SERPL-SCNC: 3.9 MMOL/L (ref 3.5–5.1)
PROT SERPL-MCNC: 7.4 G/DL (ref 6–8.4)
PROT UR QL STRIP: NEGATIVE
RBC # BLD AUTO: 4.72 M/UL (ref 4.6–6.2)
SODIUM SERPL-SCNC: 138 MMOL/L (ref 136–145)
SP GR UR STRIP: 1.02 (ref 1–1.03)
URN SPEC COLLECT METH UR: NORMAL
WBC # BLD AUTO: 5.05 K/UL (ref 3.9–12.7)

## 2023-02-28 PROCEDURE — 81003 URINALYSIS AUTO W/O SCOPE: CPT | Performed by: INTERNAL MEDICINE

## 2023-02-28 PROCEDURE — 36415 COLL VENOUS BLD VENIPUNCTURE: CPT | Performed by: INTERNAL MEDICINE

## 2023-02-28 PROCEDURE — 99395 PR PREVENTIVE VISIT,EST,18-39: ICD-10-PCS | Mod: S$GLB,,, | Performed by: INTERNAL MEDICINE

## 2023-02-28 PROCEDURE — 3075F PR MOST RECENT SYSTOLIC BLOOD PRESS GE 130-139MM HG: ICD-10-PCS | Mod: CPTII,S$GLB,, | Performed by: INTERNAL MEDICINE

## 2023-02-28 PROCEDURE — 3008F BODY MASS INDEX DOCD: CPT | Mod: CPTII,S$GLB,, | Performed by: INTERNAL MEDICINE

## 2023-02-28 PROCEDURE — 1160F PR REVIEW ALL MEDS BY PRESCRIBER/CLIN PHARMACIST DOCUMENTED: ICD-10-PCS | Mod: CPTII,S$GLB,, | Performed by: INTERNAL MEDICINE

## 2023-02-28 PROCEDURE — 85025 COMPLETE CBC W/AUTO DIFF WBC: CPT | Performed by: INTERNAL MEDICINE

## 2023-02-28 PROCEDURE — 3075F SYST BP GE 130 - 139MM HG: CPT | Mod: CPTII,S$GLB,, | Performed by: INTERNAL MEDICINE

## 2023-02-28 PROCEDURE — 80053 COMPREHEN METABOLIC PANEL: CPT | Performed by: INTERNAL MEDICINE

## 2023-02-28 PROCEDURE — 3080F DIAST BP >= 90 MM HG: CPT | Mod: CPTII,S$GLB,, | Performed by: INTERNAL MEDICINE

## 2023-02-28 PROCEDURE — 99999 PR PBB SHADOW E&M-EST. PATIENT-LVL IV: CPT | Mod: PBBFAC,,, | Performed by: INTERNAL MEDICINE

## 2023-02-28 PROCEDURE — 99999 PR PBB SHADOW E&M-EST. PATIENT-LVL IV: ICD-10-PCS | Mod: PBBFAC,,, | Performed by: INTERNAL MEDICINE

## 2023-02-28 PROCEDURE — 1160F RVW MEDS BY RX/DR IN RCRD: CPT | Mod: CPTII,S$GLB,, | Performed by: INTERNAL MEDICINE

## 2023-02-28 PROCEDURE — 1159F MED LIST DOCD IN RCRD: CPT | Mod: CPTII,S$GLB,, | Performed by: INTERNAL MEDICINE

## 2023-02-28 PROCEDURE — 3008F PR BODY MASS INDEX (BMI) DOCUMENTED: ICD-10-PCS | Mod: CPTII,S$GLB,, | Performed by: INTERNAL MEDICINE

## 2023-02-28 PROCEDURE — 99395 PREV VISIT EST AGE 18-39: CPT | Mod: S$GLB,,, | Performed by: INTERNAL MEDICINE

## 2023-02-28 PROCEDURE — 1159F PR MEDICATION LIST DOCUMENTED IN MEDICAL RECORD: ICD-10-PCS | Mod: CPTII,S$GLB,, | Performed by: INTERNAL MEDICINE

## 2023-02-28 PROCEDURE — 3080F PR MOST RECENT DIASTOLIC BLOOD PRESSURE >= 90 MM HG: ICD-10-PCS | Mod: CPTII,S$GLB,, | Performed by: INTERNAL MEDICINE

## 2023-02-28 NOTE — PATIENT INSTRUCTIONS
Low-Salt Choices  Eating salt (sodium) can make your body retain too much water. Excess water makes your heart work harder. Canned, packaged, and frozen foods are easy to prepare, but they are often high in sodium. Here are some ideas for low-salt foods you can easily prepare yourself.    For Breakfast  Fruit or fruit juice  Bread or an English muffin  Shredded wheat  Plains tortillas  Steamed rice, unsalted  Hot cereal, regular (not instant) made without salt  Stay away from:  Sausage, louise, ham  Flour tortillas  Packaged muffins, pancakes, and biscuits  For Lunch and Dinner  Fresh fish, chicken, turkey, or meat-- baked, broiled, or roasted without salt  Dry beans, cooked without salt  Tofu, stir-fried without salt  Stay away from:  Lunch meat  Cheese  Tomato juice and catsup  Canned vegetables, soups, fish  Packaged gravies and sauces  Olives, pickles, relish  Bottled salad dressings  For Snacks and Desserts  Yogurt  Popcorn, air popped, unsalted  Stay away from:  Pies  Canned and packaged puddings  Pretzels, chips, crackers, and nuts--unless the label says unsalted  © 8400-9415 [facility], [address] [place] [address], [place] [address]. All rights reserved. This information is not intended as a substitute for professional medical care. Always follow your healthcare professional's instructions.          Taking Your Blood Pressure  Blood pressure is the force of blood as it moves from the heart through the blood vessels. You can take your own blood pressure reading using a digital monitor. Take readings as often as your doctor instructs. Take each reading at the same time of day.    Step 1. Relax  Wait at least a half-hour after smoking, eating, or exercising.  Sit comfortably at a table. Place the monitor near you.  Rest for a few minutes before you begin.    Step 2. Wrap the Cuff  Place your arm on the table, palm up. Your arm should be at the level of your heart. Wrap the cuff around your upper arm, just above  your elbow. Its best done on bare skin, not over clothing.  Make sure your cuff fits. If it doesnt wrap around your upper arm, order a larger cuff.    Step 3. Inflate the Cuff  Pump the cuff until the scale reads 160. If you have a self-inflating cuff, push the button that starts the pump.  The cuff will tighten, then loosen.  The numbers will change. When they stop changing, your blood pressure reading will appear.  If you get a reading that is too high or too low for you, relax for a few minutes. Then do the test again.    Step 4. Write Down the Results  Write down your blood pressure numbers. Note the date and time. Keep your results in one place, such as a notebook.  Remove the cuff from your arm. Turn off the machine.  © 1681-9749 [employer]. [address] [place] [address], [place] [address]. All rights reserved. This information is not intended as a substitute for professional medical care. Always follow your healthcare professional's instructions.

## 2023-02-28 NOTE — PROGRESS NOTES
Subjective:       Patient ID: Sathya Duggan is a 32 y.o. male.    Chief Complaint: Annual Exam    Here for annual exam    Anxiety a bit btr.    Rare use of 3 tabs xanax has been helpful.    No discoid lupus rash issues recently.    No other new health issues.    Denies lupus symptoms.    Review of Systems   Constitutional:  Negative for appetite change and unexpected weight change.   Respiratory:  Negative for chest tightness and shortness of breath.    Cardiovascular:  Negative for chest pain.   Gastrointestinal:  Negative for abdominal pain.   Genitourinary:  Negative for difficulty urinating, scrotal swelling and testicular pain.   Skin:         No lesions   Psychiatric/Behavioral:  The patient is nervous/anxious.          Objective:      Physical Exam  Vitals reviewed.   Constitutional:       General: He is not in acute distress.     Appearance: Normal appearance. He is well-developed. He is obese. He is not ill-appearing, toxic-appearing or diaphoretic.   HENT:      Head: Normocephalic and atraumatic.   Eyes:      General: No scleral icterus.  Neck:      Thyroid: No thyromegaly.   Cardiovascular:      Rate and Rhythm: Normal rate and regular rhythm.      Heart sounds: Normal heart sounds. No murmur heard.    No friction rub. No gallop.   Pulmonary:      Effort: Pulmonary effort is normal. No respiratory distress.      Breath sounds: Normal breath sounds. No wheezing or rales.   Abdominal:      General: Bowel sounds are normal. There is no distension.      Palpations: Abdomen is soft. There is no mass.      Tenderness: There is no abdominal tenderness. There is no guarding or rebound.   Musculoskeletal:         General: Normal range of motion.      Cervical back: Normal range of motion.   Lymphadenopathy:      Cervical: No cervical adenopathy.   Skin:     Findings: No lesion.   Neurological:      Mental Status: He is alert and oriented to person, place, and time.   Psychiatric:         Mood and Affect: Mood  normal.         Behavior: Behavior normal.         Thought Content: Thought content normal.       Assessment:       1. Routine general medical examination at a health care facility    2. Elevated systolic blood pressure reading without diagnosis of hypertension        Plan:       Sathya was seen today for annual exam.    Diagnoses and all orders for this visit:    Routine general medical examination at a health care facility  -     CBC Auto Differential; Future  -     Comprehensive Metabolic Panel; Future  -     Urinalysis, Reflex to Urine Culture Urine, Clean Catch    Elevated systolic blood pressure reading without diagnosis of hypertension  elevated BP: requested that pt check his BP twice per week at [facility]/other pharmacy, record the numbers, and call back if routinely >130/85, reiterated need for weight loss and salt restriction  RTC to have nurse BP check, offered med, he declines for now    Discussed CRC screening at 34    He does not have symptoms of systemic lupus at this time    Health Maintenance         Date Due Completion Date    COVID-19 Vaccine (3 - Booster for Pfizer series) 06/16/2021 4/21/2021    Influenza Vaccine (1) Never done ---    TETANUS VACCINE 05/26/2030 5/26/2020   Declines vaccines         Follow up in about 1 year (around 2/28/2024).    No future appointments.

## 2024-06-10 ENCOUNTER — PATIENT MESSAGE (OUTPATIENT)
Dept: INTERNAL MEDICINE | Facility: CLINIC | Age: 34
End: 2024-06-10
Payer: COMMERCIAL